# Patient Record
Sex: MALE | Race: WHITE | NOT HISPANIC OR LATINO | Employment: OTHER | URBAN - METROPOLITAN AREA
[De-identification: names, ages, dates, MRNs, and addresses within clinical notes are randomized per-mention and may not be internally consistent; named-entity substitution may affect disease eponyms.]

---

## 2020-11-14 ENCOUNTER — HOSPITAL ENCOUNTER (EMERGENCY)
Facility: HOSPITAL | Age: 65
Discharge: HOME | End: 2020-11-15
Attending: EMERGENCY MEDICINE
Payer: MEDICARE

## 2020-11-14 ENCOUNTER — APPOINTMENT (EMERGENCY)
Dept: RADIOLOGY | Facility: HOSPITAL | Age: 65
End: 2020-11-14

## 2020-11-14 DIAGNOSIS — F10.920 ALCOHOLIC INTOXICATION WITHOUT COMPLICATION (HCC): Primary | ICD-10-CM

## 2020-11-14 LAB
AMPHETAMINES SERPL QL SCN: NEGATIVE
ANION GAP SERPL CALCULATED.3IONS-SCNC: 6 MMOL/L (ref 4–13)
APAP SERPL-MCNC: <2 UG/ML (ref 10–20)
APTT PPP: 25 SECONDS (ref 23–37)
BARBITURATES UR QL: NEGATIVE
BENZODIAZ UR QL: NEGATIVE
BUN SERPL-MCNC: 9 MG/DL (ref 5–25)
CALCIUM SERPL-MCNC: 9.1 MG/DL (ref 8.3–10.1)
CHLORIDE SERPL-SCNC: 107 MMOL/L (ref 100–108)
CO2 SERPL-SCNC: 32 MMOL/L (ref 21–32)
COCAINE UR QL: NEGATIVE
CREAT SERPL-MCNC: 1.01 MG/DL (ref 0.6–1.3)
ERYTHROCYTE [DISTWIDTH] IN BLOOD BY AUTOMATED COUNT: 13.6 % (ref 11.6–15.1)
ETHANOL SERPL-MCNC: 276 MG/DL (ref 0–3)
FLUAV RNA RESP QL NAA+PROBE: NEGATIVE
FLUBV RNA RESP QL NAA+PROBE: NEGATIVE
GFR SERPL CREATININE-BSD FRML MDRD: 77 ML/MIN/1.73SQ M
GLUCOSE SERPL-MCNC: 106 MG/DL (ref 65–140)
GLUCOSE SERPL-MCNC: 86 MG/DL (ref 65–140)
HCT VFR BLD AUTO: 45 % (ref 36.5–49.3)
HGB BLD-MCNC: 14.6 G/DL (ref 12–17)
INR PPP: 0.95 (ref 0.84–1.19)
MCH RBC QN AUTO: 30 PG (ref 26.8–34.3)
MCHC RBC AUTO-ENTMCNC: 32.4 G/DL (ref 31.4–37.4)
MCV RBC AUTO: 92 FL (ref 82–98)
METHADONE UR QL: NEGATIVE
OPIATES UR QL SCN: NEGATIVE
OXYCODONE+OXYMORPHONE UR QL SCN: NEGATIVE
PCP UR QL: NEGATIVE
PLATELET # BLD AUTO: 188 THOUSANDS/UL (ref 149–390)
PMV BLD AUTO: 9.2 FL (ref 8.9–12.7)
POTASSIUM SERPL-SCNC: 3.8 MMOL/L (ref 3.5–5.3)
PROTHROMBIN TIME: 12.7 SECONDS (ref 11.6–14.5)
RBC # BLD AUTO: 4.87 MILLION/UL (ref 3.88–5.62)
RSV RNA RESP QL NAA+PROBE: NEGATIVE
SALICYLATES SERPL-MCNC: <3 MG/DL (ref 3–20)
SARS-COV-2 RNA RESP QL NAA+PROBE: NEGATIVE
SODIUM SERPL-SCNC: 145 MMOL/L (ref 136–145)
THC UR QL: NEGATIVE
TROPONIN I SERPL-MCNC: <0.02 NG/ML
WBC # BLD AUTO: 5.95 THOUSAND/UL (ref 4.31–10.16)

## 2020-11-14 PROCEDURE — 70496 CT ANGIOGRAPHY HEAD: CPT

## 2020-11-14 PROCEDURE — 82948 REAGENT STRIP/BLOOD GLUCOSE: CPT

## 2020-11-14 PROCEDURE — 85027 COMPLETE CBC AUTOMATED: CPT | Performed by: EMERGENCY MEDICINE

## 2020-11-14 PROCEDURE — 96372 THER/PROPH/DIAG INJ SC/IM: CPT

## 2020-11-14 PROCEDURE — 0241U HB NFCT DS VIR RESP RNA 4 TRGT: CPT | Performed by: EMERGENCY MEDICINE

## 2020-11-14 PROCEDURE — 84484 ASSAY OF TROPONIN QUANT: CPT | Performed by: EMERGENCY MEDICINE

## 2020-11-14 PROCEDURE — 85730 THROMBOPLASTIN TIME PARTIAL: CPT | Performed by: EMERGENCY MEDICINE

## 2020-11-14 PROCEDURE — 93005 ELECTROCARDIOGRAM TRACING: CPT

## 2020-11-14 PROCEDURE — 80329 ANALGESICS NON-OPIOID 1 OR 2: CPT | Performed by: EMERGENCY MEDICINE

## 2020-11-14 PROCEDURE — 36415 COLL VENOUS BLD VENIPUNCTURE: CPT | Performed by: EMERGENCY MEDICINE

## 2020-11-14 PROCEDURE — 80048 BASIC METABOLIC PNL TOTAL CA: CPT | Performed by: EMERGENCY MEDICINE

## 2020-11-14 PROCEDURE — 70498 CT ANGIOGRAPHY NECK: CPT

## 2020-11-14 PROCEDURE — 99284 EMERGENCY DEPT VISIT MOD MDM: CPT | Performed by: EMERGENCY MEDICINE

## 2020-11-14 PROCEDURE — 99285 EMERGENCY DEPT VISIT HI MDM: CPT

## 2020-11-14 PROCEDURE — 80320 DRUG SCREEN QUANTALCOHOLS: CPT | Performed by: EMERGENCY MEDICINE

## 2020-11-14 PROCEDURE — 80307 DRUG TEST PRSMV CHEM ANLYZR: CPT | Performed by: EMERGENCY MEDICINE

## 2020-11-14 PROCEDURE — 85610 PROTHROMBIN TIME: CPT | Performed by: EMERGENCY MEDICINE

## 2020-11-14 RX ORDER — OLANZAPINE 10 MG/1
5 INJECTION, POWDER, LYOPHILIZED, FOR SOLUTION INTRAMUSCULAR ONCE
Status: COMPLETED | OUTPATIENT
Start: 2020-11-14 | End: 2020-11-14

## 2020-11-14 RX ORDER — OLANZAPINE 10 MG/1
10 TABLET, ORALLY DISINTEGRATING ORAL ONCE
Status: DISCONTINUED | OUTPATIENT
Start: 2020-11-14 | End: 2020-11-14

## 2020-11-14 RX ADMIN — IOHEXOL 85 ML: 350 INJECTION, SOLUTION INTRAVENOUS at 20:16

## 2020-11-14 RX ADMIN — OLANZAPINE 5 MG: 10 INJECTION, POWDER, FOR SOLUTION INTRAMUSCULAR at 21:52

## 2020-11-15 VITALS
SYSTOLIC BLOOD PRESSURE: 169 MMHG | TEMPERATURE: 97.4 F | HEART RATE: 54 BPM | RESPIRATION RATE: 18 BRPM | WEIGHT: 249.56 LBS | DIASTOLIC BLOOD PRESSURE: 79 MMHG | OXYGEN SATURATION: 98 %

## 2020-11-15 LAB
ATRIAL RATE: 53 BPM
P AXIS: 12 DEGREES
PR INTERVAL: 202 MS
QRS AXIS: 50 DEGREES
QRSD INTERVAL: 98 MS
QT INTERVAL: 450 MS
QTC INTERVAL: 422 MS
T WAVE AXIS: 87 DEGREES
VENTRICULAR RATE: 53 BPM

## 2020-11-15 PROCEDURE — 93010 ELECTROCARDIOGRAM REPORT: CPT | Performed by: INTERNAL MEDICINE

## 2021-09-15 ENCOUNTER — CONSULT (OUTPATIENT)
Dept: PAIN MEDICINE | Facility: CLINIC | Age: 66
End: 2021-09-15
Payer: MEDICARE

## 2021-09-15 VITALS — HEART RATE: 58 BPM | DIASTOLIC BLOOD PRESSURE: 83 MMHG | SYSTOLIC BLOOD PRESSURE: 150 MMHG | WEIGHT: 252 LBS

## 2021-09-15 DIAGNOSIS — Z98.890 HISTORY OF LUMBAR SURGERY: ICD-10-CM

## 2021-09-15 DIAGNOSIS — M54.16 LUMBAR RADICULOPATHY: ICD-10-CM

## 2021-09-15 DIAGNOSIS — G89.4 CHRONIC PAIN SYNDROME: Primary | ICD-10-CM

## 2021-09-15 DIAGNOSIS — R29.898 WEAKNESS OF LEFT LOWER EXTREMITY: ICD-10-CM

## 2021-09-15 DIAGNOSIS — F33.42 RECURRENT MAJOR DEPRESSIVE DISORDER, IN FULL REMISSION (HCC): ICD-10-CM

## 2021-09-15 PROCEDURE — 99204 OFFICE O/P NEW MOD 45 MIN: CPT | Performed by: PHYSICAL MEDICINE & REHABILITATION

## 2021-09-15 RX ORDER — FLUOXETINE HYDROCHLORIDE 20 MG/1
60 CAPSULE ORAL DAILY
COMMUNITY
Start: 2020-11-12

## 2021-09-15 RX ORDER — LEVOTHYROXINE SODIUM 0.03 MG/1
25 TABLET ORAL DAILY
COMMUNITY
Start: 2020-07-17

## 2021-09-15 NOTE — PROGRESS NOTES
Assessment  1  Chronic pain syndrome    2  Lumbar radiculopathy    3  Weakness of left lower extremity    4  History of lumbar surgery    5  Recurrent major depressive disorder, in full remission [facility])        Plan  Mr Marina Guillermo is a pleasant 49-year-old male who presents for initial evaluation regarding lumbar radiculopathy into the left lower extremity with associated weakness and atrophy of the left calf  He reports a previous history of a lumbar surgery but patient cannot recall if this was purely diskectomy or also with laminectomy at L5-S1 approximately 25 years ago  During today's evaluation he is demonstrating clinical and diagnostic evidence of lumbar radiculopathy with visible atrophy of the left gastrocs and patient reports he has been fairly inactive since the start of COVID over the last 18 months  At this time we will place the patient in a formal physical therapy program x4 weeks or longer if needed as well as order an EMG/NCV  All questions answered, patient is agreeable with plan  My impressions and treatment recommendations were discussed in detail with the patient who verbalized understanding and had no further questions  Discharge instructions were provided  I personally saw and examined the patient and I agree with the above discussed plan of care      Orders Placed This Encounter   Procedures    Ambulatory referral to Physical Therapy     Standing Status:   Future     Standing Expiration Date:   9/15/2022     Referral Priority:   Routine     Referral Type:   Physical Therapy     Referral Reason:   Specialty Services Required     Requested Specialty:   Physical Therapy     Number of Visits Requested:   1     Expiration Date:   9/15/2022    EMG 2 limb lower extremity     Standing Status:   Future     Standing Expiration Date:   9/15/2022     Scheduling Instructions:      H/o lumbar surgery 20+ years ago, recent development of left calf atrophy and weakness     Order Specific Question: Possible Diagnosis: Answer:   patient scheduled - unknown     New Medications Ordered This Visit   Medications    Red Yeast Rice Extract (RED YEAST RICE PO)     Sig: Take 1 capsule by mouth daily    levothyroxine 25 mcg tablet     Sig: Take 25 mcg by mouth daily    FLUoxetine (PROzac) 20 mg capsule     Sig: Take 60 mg by mouth daily       History of Present Illness    Devora Theodore is a 77 y o  male presents to [place] [address] [facility] regarding 26 5 years duration of low back pain with intermittent radiating symptoms into the left lower extremity  Patient denies any significant inciting event or recent trauma but does report a previous lumbar surgery 25 years ago after a "blown disc"  Today patient reports moderate to severe pain rated 6/10 and interfering with daily activities  Pain is constant 100% of the time that is present throughout the day and night  Describes symptoms as numbness, throbbing  Also reports lower extremity weakness but denies falls  Pain is worse with lying down, sitting  He has had excellent relief from previous surgery and injections and no significant improvements from home exercise  Symptoms are improved with over-the-counter Tylenol and Motrin  Presents today for initial evaluation  I have personally reviewed and/or updated the patient's past medical history, past surgical history, family history, social history, current medications, allergies, and vital signs today  Review of Systems   Constitutional: Negative for fever and unexpected weight change  HENT: Negative for trouble swallowing  Eyes: Negative for visual disturbance  Respiratory: Negative for shortness of breath and wheezing  Cardiovascular: Negative for chest pain and palpitations  Gastrointestinal: Negative for constipation, diarrhea, nausea and vomiting  Endocrine: Negative for cold intolerance, heat intolerance and polydipsia     Genitourinary: Negative for difficulty urinating and frequency  Musculoskeletal: Positive for back pain, gait problem and joint swelling  Negative for arthralgias and myalgias  Skin: Negative for rash  Neurological: Negative for dizziness, seizures, syncope, weakness and headaches  Hematological: Does not bruise/bleed easily  Psychiatric/Behavioral: Negative for dysphoric mood  All other systems reviewed and are negative  Patient Active Problem List   Diagnosis    Recurrent major depressive disorder, in full remission ([facility] [address] )       No past medical history on file  No past surgical history on file  No family history on file  Social History     Occupational History    Not on file   Tobacco Use    Smoking status: Former Smoker    Smokeless tobacco: Never Used   Substance and Sexual Activity    Alcohol use: Yes    Drug use: Not on file    Sexual activity: Not on file       Current Outpatient Medications on File Prior to Visit   Medication Sig    FLUoxetine (PROzac) 20 mg capsule Take 60 mg by mouth daily    levothyroxine 25 mcg tablet Take 25 mcg by mouth daily    Red Yeast Rice Extract (RED YEAST RICE PO) Take 1 capsule by mouth daily     No current facility-administered medications on file prior to visit  No Known Allergies    Physical Exam    /83   Pulse 58   Wt 114 kg (252 lb)     Constitutional: normal, well developed, well nourished, alert, in no distress and non-toxic and no overt pain behavior    Eyes: anicteric  HEENT: grossly intact  Neck: supple, symmetric, trachea midline and no masses   Pulmonary:even and unlabored  Cardiovascular:No edema or pitting edema present  Skin:Normal without rashes or lesions and well hydrated  Psychiatric:Mood and affect appropriate  Neurologic:Cranial Nerves II-XII grossly intact  Musculoskeletal:antalgic, tenderness to palpation left-sided lumbar paraspinals, decreased active and passive range of motion with lumbar flexion and extension limited by pain, MMT 5/5 bilateral lower extremities except for left plantar flexion 4/5, visible atrophy of the left gastroc musculature, sensation grossly intact to light touch, DTRs within normal limits, negative straight leg raise in the seated position    Imaging

## 2021-09-15 NOTE — PATIENT INSTRUCTIONS
Chronic Pain   WHAT YOU NEED TO KNOW:   Chronic pain is pain that does not get better for 3 months or longer  Chronic pain may hurt all the time, or come and go  DISCHARGE INSTRUCTIONS:   Call your local emergency number or have someone else call (911 in the [address]) if:   · You are breathing slower than normal, or you have trouble breathing  · You cannot be awakened  · You have a seizure  Call your doctor if:   · Your heart feels like it is jumping or fluttering  · You cannot think clearly  · You have side effects from prescription pain medicine, such as itching, nausea, or vomiting  · You have trouble sleeping  · Your pain gets worse, even after you take medicine  · You don't think the medicine is working  · You have questions or concerns about your condition or care  Medicines: You may need any of the following:  · Acetaminophen  decreases pain and fever  It is available without a doctor's order  Ask how much to take and how often to take it  Follow directions  Read the labels of all other medicines you are using to see if they also contain acetaminophen, or ask your doctor or pharmacist  Acetaminophen can cause liver damage if not taken correctly  Do not use more than 4 grams (4,000 milligrams) total of acetaminophen in one day  · NSAIDs , such as ibuprofen, help decrease swelling, pain, and fever  This medicine is available with or without a doctor's order  NSAIDs can cause stomach bleeding or kidney problems in certain people  If you take blood thinner medicine, always ask your healthcare provider if NSAIDs are safe for you  Always read the medicine label and follow directions  · Prescription pain medicine  called narcotics or opioids may be given for certain types of chronic pain  Ask your healthcare provider how to take this medicine safely  · Anesthetics  can be rubbed on your skin or injected into a nerve or muscle to numb an area      · Other medicines  may reduce pain, anxiety, muscle tension, or swelling  · Take your medicine as directed  Contact your healthcare provider if you think your medicine is not helping or if you have side effects  Tell him of her if you are allergic to any medicine  Keep a list of the medicines, vitamins, and herbs you take  Include the amounts, and when and why you take them  Bring the list or the pill bottles to follow-up visits  Carry your medicine list with you in case of an emergency  Manage your chronic pain:   · Apply heat  on the area in pain for 20 to 30 minutes every 2 hours for as many days as directed  Heat helps decrease pain and muscle spasms  · Apply ice  on the part of your body that hurts for 15 to 20 minutes every hour or as directed  Use an ice pack, or put crushed ice in a plastic bag  Cover it with a towel  Ice decreases pain and swelling, and helps prevent tissue damage  · Go to physical therapy as directed  A physical therapist teaches you exercises to help improve movement and strength, and to decrease pain  · Exercise for 30 minutes, 3 times a week  Regular physical activity can help decrease pain and improve your quality of life  Ask your healthcare provider about the best exercise plan for your type of pain  · Get enough sleep  Create a relaxing bedtime routine  Go to sleep and wake up at the same time every day  Avoid caffeine in the afternoon  · Talk with a counselor or therapist   A type of counseling called cognitive behavioral therapy (CBT) can help your chronic pain by changing the way you think about it  CBT can also improve your mood, sleep, and ability to move  What you must know if you take narcotic pain medicine:   · You may need to take a bowel movement softener  The most common side effect of prescription pain medicine is constipation  Bowel movement softeners are available over the counter  · Do not mix prescription pain medicines    This can cause an overdose of medicine, which can become life-threatening  Read labels  Make sure you know the ingredients in all of your medicines  · Do not drink alcohol  when you take prescription pain medicine  It is not safe to mix narcotics or opioids with alcohol or illegal drugs  · Prescription pain medicine may impair your ability to drive or work safely  They may also cause dizziness and increase your risk for falling  · Store prescription pain medicine in a safe location at home  Keep your medicine away from children and other people  Never share your medicine with anyone  Follow up with your healthcare provider as directed: You may be referred to a pain specialist  Write down your questions so you remember to ask them during your visits  © Copyright [employer] 2021 Information is for End User's use only and may not be sold, redistributed or otherwise used for commercial purposes  All illustrations and images included in CareNotes® are the copyrighted property of [employer]  or Altagracia Castellanos   The above information is an  only  It is not intended as medical advice for individual conditions or treatments  Talk to your doctor, nurse or pharmacist before following any medical regimen to see if it is safe and effective for you

## 2021-09-22 ENCOUNTER — EVALUATION (OUTPATIENT)
Dept: PHYSICAL THERAPY | Facility: REHABILITATION | Age: 66
End: 2021-09-22
Payer: MEDICARE

## 2021-09-22 DIAGNOSIS — Z98.890 HISTORY OF LUMBAR SURGERY: ICD-10-CM

## 2021-09-22 DIAGNOSIS — R29.898 WEAKNESS OF LEFT LOWER EXTREMITY: ICD-10-CM

## 2021-09-22 DIAGNOSIS — M54.16 LUMBAR RADICULOPATHY: ICD-10-CM

## 2021-09-22 DIAGNOSIS — G89.4 CHRONIC PAIN SYNDROME: Primary | ICD-10-CM

## 2021-09-22 PROCEDURE — 97110 THERAPEUTIC EXERCISES: CPT | Performed by: PHYSICAL THERAPIST

## 2021-09-22 PROCEDURE — 97161 PT EVAL LOW COMPLEX 20 MIN: CPT | Performed by: PHYSICAL THERAPIST

## 2021-09-22 NOTE — PROGRESS NOTES
PT Evaluation     Today's date: 2021  Patient name: Barrington Amezquita  : 1955  MRN: [id number]  Referring provider: August Monroe DO  Dx:   Encounter Diagnosis     ICD-10-CM    1  Chronic pain syndrome  G89 4 Ambulatory referral to Physical Therapy   2  Lumbar radiculopathy  M54 16 Ambulatory referral to Physical Therapy   3  History of lumbar surgery  Z98 890 Ambulatory referral to Physical Therapy   4  Weakness of left lower extremity  R29 898 Ambulatory referral to Physical Therapy       Start Time: 1745  Stop Time: 1845  Total time in clinic (min): 60 minutes    Assessment  Assessment details: Barrington Amezquita is a 77y o  year old male who presents to [facility] with:   Chronic pain syndrome  (primary encounter diagnosis)  Lumbar radiculopathy  History of lumbar surgery  Weakness of left lower extremity  Patient presents to PT with complaints of left lower extremity weakness  Patient has a history of lumbar surgery in  and chronic low back pain likely contributing to neural pathology causing atrophy and decreased sensation of LLE  He presents with evident LLE strength deficits, decreased sensation and impaired balance/stability as a result of these impairments  He also presents with significant lumbar and bilateral hip tightness/hypomobility  Enochi Moritz presents with the impairments as listed above and would benefit from Physical Therapy to address these impairments and to maximize function  Impairments: abnormal or restricted ROM, impaired physical strength, lacks appropriate home exercise program, pain with function and poor posture   Understanding of Dx/Px/POC: good   Prognosis: good    Goals  Short-Term Goals:   1  Patient will report <5/10 LBP  2  Patient will demonstrate compliance with HEP  Long-Term Goals:   1  Patient will demonstrate improved LLE strength by at least 1-2 grades  2  Patient will return to exercise routine without limitations from strength or balance    3  Patient independent with HEP at time of discharge  Plan  Plan details: Thank you for opportunity to participate in the care of Savannah Rizzo  Patient would benefit from: skilled physical therapy and PT eval  Planned modality interventions: TENS, unattended electrical stimulation and thermotherapy: hydrocollator packs  Planned therapy interventions: abdominal trunk stabilization, flexibility, home exercise program, therapeutic exercise, therapeutic activities, stretching, strengthening, postural training, patient education, neuromuscular re-education, massage, manual therapy and joint mobilization  Frequency: 2x week  Duration in visits: 10  Plan of Care beginning date: 9/22/2021  Treatment plan discussed with: patient        Subjective Evaluation    History of Present Illness  Mechanism of injury: Patient is a 77 y o  presenting to physical therapy with complaints of left lower extremity weakness  He also complains of some low back pain if he sits for a long time without lumbar support  He reports observable left leg atrophy and evident left lower extremity weakness that he has noticed within the last few months  Patient reports minimal functionality of his left leg such as can't do a heel raise on that side  He reports unsteadiness but no loss of balance due to this  He reports difficulty climbing stairs with having to hold the railing for support  He was an avid backpacker but has not done this in 2 years, he would like to return to this activity next year  He has had to use walking sticks for steadying when hiking  He is scheduled for EMG testing in December      N/T/Radicular pain:   - No sensation in last three toes on left foot  - Numbness/Tingling from shin to toes   - These symptoms have been present since lumbar surgery in 1996    Falls:  - Has had three major falls while hiking in the last few years    Bowel/Bladder changes: None  Previous Injury: Patient had lumbar surgery in 1996 that he believes was disc removal and fusion  Imaging: None  Occupation: Retired; works part time senior care     PT goals: "do something to counteract this atrophy"  Pain  Current pain ratin  At best pain ratin  At worst pain ratin  Quality: radiating  Relieving factors: support, rest and change in position  Aggravating factors: sitting    Social Support  Lives with: spouse    Employment status: working  Hand dominance: right    Treatments  Current treatment: physical therapy  Patient Goals  Patient goals for therapy: decreased pain, improved balance, increased motion, increased strength and return to sport/leisure activities          Objective     Concurrent Complaints  Negative for night pain, disturbed sleep, bladder dysfunction, bowel dysfunction, saddle (S4) numbness, cardiac problem, kidney problem, gallbladder problem, stomach problem, ulcer, appendix problem, spleen problem, pancreas problem, history of cancer, history of trauma and infection    Postural Observations  Seated posture: fair  Standing posture: fair    Additional Postural Observation Details  Sleeping on side with pillow between knees    Neurological Testing     Sensation     Lumbar   Left   Diminished: light touch    Right   Intact: light touch    Comments   Left light touch: L4    Reflexes   Left   Patellar (L4): trace (1+)  Achilles (S1): absent (0)  Clonus sign: negative    Right   Patellar (L4): normal (2+)  Achilles (S1): absent (0)  Clonus sign: negative    Active Range of Motion     Additional Active Range of Motion Details  Lumbar AROM screen:  Flexion: 50%   Extension: 25% (improves)  L sidebendin%  R sidebendin%  L rotation: 25%  R rotation: 25%      Joint Play     Hypomobile: T8, T9, T10, T11, T12, L1, L2, L3, L4, L5 and S1     Strength/Myotome Testing     Lumbar   Left   Heel walk: normal  Toe walk: abnormal    Right   Heel walk: normal  Toe walk: normal    Left Hip   Planes of Motion   Flexion: 4  Extension: 3  Abduction: 3-    Right Hip   Planes of Motion   Flexion: 5  Extension: 3+  Abduction: 4-    Left Knee   Flexion: 4+  Extension: 5    Right Knee   Flexion: 5  Extension: 5    Left Ankle/Foot   Dorsiflexion: 4+  Eversion: 4    Right Ankle/Foot   Dorsiflexion: 5  Eversion: 5    Additional Strength Details  L: Unable to rise onto toes    Tests     Lumbar     Left   Negative passive SLR  Right   Negative passive SLR  Left Hip   Negative ANTHONY    90/90 SLR: Positive  Right Hip   Negative ANTHONY    90/90 SLR: Positive       Additional Tests Details  Bilateral hip tightness (flexion, ER/IR)   Bilateral hamstring tightness  Bilateral quadriceps tightness    General Comments:      Lumbar Comments  Uses great toe for single leg steadying                  Precautions: History of lumbar surgery 1996    Daily Treatment Diary  * Indicates part of HEP      9/22          Manual           Foot sensation  nv          Proprioception nv          Balance testing nv                     Neuro Re-Ed                                 Lumbar flexion stretch w ball rolls nv          SLS                                 Ther Ex           Bike nv          Supine sciatic nerve glides           SKTC nv          LTR* 10x5"           Seated HS stretch* 5x10" b/l                                                      SLR flexion L* 10          SLR abduction L* 10          Bridging           Clamshells                                            Ther Activity           Step ups L nv          Lateral step ups L nv          Sit to stands           Leg press SL nv                                                      Gait Training                                             Modalities           MHP                      * = on hep

## 2021-09-27 ENCOUNTER — OFFICE VISIT (OUTPATIENT)
Dept: PHYSICAL THERAPY | Facility: REHABILITATION | Age: 66
End: 2021-09-27
Payer: MEDICARE

## 2021-09-27 DIAGNOSIS — M54.16 LUMBAR RADICULOPATHY: ICD-10-CM

## 2021-09-27 DIAGNOSIS — Z98.890 HISTORY OF LUMBAR SURGERY: ICD-10-CM

## 2021-09-27 DIAGNOSIS — R29.898 WEAKNESS OF LEFT LOWER EXTREMITY: ICD-10-CM

## 2021-09-27 DIAGNOSIS — G89.4 CHRONIC PAIN SYNDROME: Primary | ICD-10-CM

## 2021-09-27 PROCEDURE — 97530 THERAPEUTIC ACTIVITIES: CPT

## 2021-09-27 PROCEDURE — 97110 THERAPEUTIC EXERCISES: CPT

## 2021-09-27 PROCEDURE — 97112 NEUROMUSCULAR REEDUCATION: CPT

## 2021-09-27 NOTE — PROGRESS NOTES
Daily Note     Today's date: 2021  Patient name: Cong Tamez  : 1955  MRN: [id number]  Referring provider: Mendoza Lowe DO  Dx:   Encounter Diagnosis     ICD-10-CM    1  Chronic pain syndrome  G89 4    2  Lumbar radiculopathy  M54 16    3  History of lumbar surgery  Z98 890    4  Weakness of left lower extremity  R29 898        Start Time: 1703  Stop Time: 1750  Total time in clinic (min): 47 minutes    Subjective: Patient reports feeling "okay" at start of session, reporting he has started working out again  He states he is focusing on strength and conditioning three days a week, full body, and the remaining days of week he is focusing on aerobic and stretching  He also states he wants to get back into hiking  He reports he will be starting with light weight, low intensity  Patient instructed to bring in a list of exercises that he will be completing to next session patient reporting understanding  Objective: See treatment diary below       Assessment: Tolerated treatment well  Patient demonstrated fatigue post treatment, exhibited good technique with therapeutic exercises and would benefit from continued PT Initiated POC this session with patient tolerating well, fatigues quickly with most TE especially SLR abduction  Difficulty noted with counting proper number of reps and for proper rest breaks  Patient demonstrates some difficulty maintaining LLE in pedals with completion of bike  Plan: Continue per plan of care  Progress treatment as tolerated              Precautions: History of lumbar surgery     Daily Treatment Diary  * Indicates part of HEP               Manual           Foot sensation  nv          Proprioception nv          Balance testing nv                     Neuro Re-Ed                                 Lumbar flexion stretch w ball rolls nv 10x5''          SLS                                 Ther Ex           Bike nv 5' lvl 1         Supine sciatic nerve glides           SKTC nv 10''x10 ea         LTR* 10x5"  10x5'' ea         Seated HS stretch* 5x10" b/l 20''x5 b/l                                                     SLR flexion L* 10 2x10          SLR abduction L* 10 2x10          Bridging           Clamshells                                            Ther Activity           Step ups L nv 4'' 2x10         Lateral step ups L nv 4'' 2x10         Sit to stands           Leg press SL nv 55# 3x10                                                     Gait Training                                             Modalities           MHP                      * = on hep

## 2021-09-29 ENCOUNTER — OFFICE VISIT (OUTPATIENT)
Dept: PHYSICAL THERAPY | Facility: REHABILITATION | Age: 66
End: 2021-09-29
Payer: MEDICARE

## 2021-09-29 DIAGNOSIS — M54.16 LUMBAR RADICULOPATHY: ICD-10-CM

## 2021-09-29 DIAGNOSIS — R29.898 WEAKNESS OF LEFT LOWER EXTREMITY: ICD-10-CM

## 2021-09-29 DIAGNOSIS — G89.4 CHRONIC PAIN SYNDROME: Primary | ICD-10-CM

## 2021-09-29 DIAGNOSIS — Z98.890 HISTORY OF LUMBAR SURGERY: ICD-10-CM

## 2021-09-29 PROCEDURE — 97110 THERAPEUTIC EXERCISES: CPT | Performed by: PHYSICAL THERAPIST

## 2021-09-29 PROCEDURE — 97530 THERAPEUTIC ACTIVITIES: CPT | Performed by: PHYSICAL THERAPIST

## 2021-09-29 PROCEDURE — 97112 NEUROMUSCULAR REEDUCATION: CPT | Performed by: PHYSICAL THERAPIST

## 2021-09-29 NOTE — PROGRESS NOTES
Daily Note     Today's date: 2021  Patient name: Samara Cruz  : 1955  MRN: [id number]  Referring provider: Erick Ahumada, DO  Dx:   Encounter Diagnosis     ICD-10-CM    1  Chronic pain syndrome  G89 4    2  Lumbar radiculopathy  M54 16    3  History of lumbar surgery  Z98 890    4  Weakness of left lower extremity  R29 898        Start Time: 1700  Stop Time: 1745  Total time in clinic (min): 45 minutes    Subjective: Patient reports no complaints after last visit  He brought his exercise routine that he has been performing at home for review  Objective: See treatment diary below      Assessment: Tolerated treatment well  Patient requires cues for proper form with majority of TE  He has significant fatigue with straight leg raise abduction preventing him from finishing second set  Initiated sit to stands this visit with patient requiring foam boost due to difficulty with initiation, also required cues for proper form and anterior trunk lean  Reviewed patient's self made home program, discussed holding squats with weight and lunges due to safety reasons as patient presents with poor single leg balance  Patient demonstrated fatigue post treatment, exhibited good technique with therapeutic exercises and would benefit from continued PT  Plan: Continue per plan of care             Precautions: History of lumbar surgery     Daily Treatment Diary  * Indicates part of HEP              Manual           Foot sensation  nv          Proprioception nv          Balance testing nv                     Neuro Re-Ed                                 Lumbar flexion stretch w ball rolls nv 10x5''  10x5"         SLS                                 Ther Ex           Bike nv 5' lvl 1 5' lv 1         Supine sciatic nerve glides           SKTC nv 10''x10 ea 10x10" ea        LTR* 10x5"  10x5'' ea 2'         Seated HS stretch* 5x10" b/l 20''x5 b/l nv SLR flexion L* 10 2x10  2x10 b/l        SLR abduction L* 10 2x10  2x10 R, 10 L (Fatigue)        Bridging   2x10        Clamshells   nv                   PPT marches   nv        PPT leg extensions           Ther Activity           Step ups L nv 4'' 2x10 6" 2x10 L         Lateral step ups L nv 4'' 2x10 6" 2x10 L        Sit to stands   Foam 2x10        Leg press SL nv 55# 3x10 75# 3x12 b/l                                                    Gait Training                                             Modalities           MHP                      * = on hep

## 2021-10-04 ENCOUNTER — OFFICE VISIT (OUTPATIENT)
Dept: PHYSICAL THERAPY | Facility: REHABILITATION | Age: 66
End: 2021-10-04
Payer: MEDICARE

## 2021-10-04 DIAGNOSIS — Z98.890 HISTORY OF LUMBAR SURGERY: ICD-10-CM

## 2021-10-04 DIAGNOSIS — R29.898 WEAKNESS OF LEFT LOWER EXTREMITY: ICD-10-CM

## 2021-10-04 DIAGNOSIS — G89.4 CHRONIC PAIN SYNDROME: Primary | ICD-10-CM

## 2021-10-04 DIAGNOSIS — M54.16 LUMBAR RADICULOPATHY: ICD-10-CM

## 2021-10-04 PROCEDURE — 97530 THERAPEUTIC ACTIVITIES: CPT

## 2021-10-04 PROCEDURE — 97110 THERAPEUTIC EXERCISES: CPT

## 2021-10-04 PROCEDURE — 97112 NEUROMUSCULAR REEDUCATION: CPT

## 2021-10-07 ENCOUNTER — OFFICE VISIT (OUTPATIENT)
Dept: PHYSICAL THERAPY | Facility: REHABILITATION | Age: 66
End: 2021-10-07
Payer: MEDICARE

## 2021-10-07 DIAGNOSIS — M54.16 LUMBAR RADICULOPATHY: ICD-10-CM

## 2021-10-07 DIAGNOSIS — Z98.890 HISTORY OF LUMBAR SURGERY: ICD-10-CM

## 2021-10-07 DIAGNOSIS — R29.898 WEAKNESS OF LEFT LOWER EXTREMITY: ICD-10-CM

## 2021-10-07 DIAGNOSIS — G89.4 CHRONIC PAIN SYNDROME: Primary | ICD-10-CM

## 2021-10-07 PROCEDURE — 97112 NEUROMUSCULAR REEDUCATION: CPT

## 2021-10-07 PROCEDURE — 97110 THERAPEUTIC EXERCISES: CPT

## 2021-10-07 PROCEDURE — 97530 THERAPEUTIC ACTIVITIES: CPT

## 2021-10-11 ENCOUNTER — APPOINTMENT (OUTPATIENT)
Dept: PHYSICAL THERAPY | Facility: REHABILITATION | Age: 66
End: 2021-10-11
Payer: MEDICARE

## 2021-10-13 ENCOUNTER — APPOINTMENT (OUTPATIENT)
Dept: PHYSICAL THERAPY | Facility: REHABILITATION | Age: 66
End: 2021-10-13
Payer: MEDICARE

## 2021-10-14 ENCOUNTER — APPOINTMENT (OUTPATIENT)
Dept: PHYSICAL THERAPY | Facility: REHABILITATION | Age: 66
End: 2021-10-14
Payer: MEDICARE

## 2021-10-18 ENCOUNTER — OFFICE VISIT (OUTPATIENT)
Dept: PHYSICAL THERAPY | Facility: REHABILITATION | Age: 66
End: 2021-10-18
Payer: MEDICARE

## 2021-10-18 DIAGNOSIS — M54.16 LUMBAR RADICULOPATHY: ICD-10-CM

## 2021-10-18 DIAGNOSIS — G89.4 CHRONIC PAIN SYNDROME: Primary | ICD-10-CM

## 2021-10-18 DIAGNOSIS — R29.898 WEAKNESS OF LEFT LOWER EXTREMITY: ICD-10-CM

## 2021-10-18 DIAGNOSIS — Z98.890 HISTORY OF LUMBAR SURGERY: ICD-10-CM

## 2021-10-18 PROCEDURE — 97530 THERAPEUTIC ACTIVITIES: CPT

## 2021-10-18 PROCEDURE — 97110 THERAPEUTIC EXERCISES: CPT

## 2021-10-21 ENCOUNTER — OFFICE VISIT (OUTPATIENT)
Dept: PHYSICAL THERAPY | Facility: REHABILITATION | Age: 66
End: 2021-10-21
Payer: MEDICARE

## 2021-10-21 DIAGNOSIS — M54.16 LUMBAR RADICULOPATHY: ICD-10-CM

## 2021-10-21 DIAGNOSIS — R29.898 WEAKNESS OF LEFT LOWER EXTREMITY: ICD-10-CM

## 2021-10-21 DIAGNOSIS — Z98.890 HISTORY OF LUMBAR SURGERY: ICD-10-CM

## 2021-10-21 DIAGNOSIS — G89.4 CHRONIC PAIN SYNDROME: Primary | ICD-10-CM

## 2021-10-21 PROCEDURE — 97110 THERAPEUTIC EXERCISES: CPT

## 2021-10-21 PROCEDURE — 97530 THERAPEUTIC ACTIVITIES: CPT

## 2021-10-25 ENCOUNTER — OFFICE VISIT (OUTPATIENT)
Dept: PHYSICAL THERAPY | Facility: REHABILITATION | Age: 66
End: 2021-10-25
Payer: MEDICARE

## 2021-10-25 DIAGNOSIS — M54.16 LUMBAR RADICULOPATHY: ICD-10-CM

## 2021-10-25 DIAGNOSIS — R29.898 WEAKNESS OF LEFT LOWER EXTREMITY: ICD-10-CM

## 2021-10-25 DIAGNOSIS — Z98.890 HISTORY OF LUMBAR SURGERY: ICD-10-CM

## 2021-10-25 DIAGNOSIS — G89.4 CHRONIC PAIN SYNDROME: Primary | ICD-10-CM

## 2021-10-25 PROCEDURE — 97110 THERAPEUTIC EXERCISES: CPT

## 2021-10-25 PROCEDURE — 97530 THERAPEUTIC ACTIVITIES: CPT

## 2021-10-28 ENCOUNTER — OFFICE VISIT (OUTPATIENT)
Dept: PHYSICAL THERAPY | Facility: REHABILITATION | Age: 66
End: 2021-10-28
Payer: MEDICARE

## 2021-10-28 DIAGNOSIS — G89.4 CHRONIC PAIN SYNDROME: Primary | ICD-10-CM

## 2021-10-28 DIAGNOSIS — R29.898 WEAKNESS OF LEFT LOWER EXTREMITY: ICD-10-CM

## 2021-10-28 DIAGNOSIS — Z98.890 HISTORY OF LUMBAR SURGERY: ICD-10-CM

## 2021-10-28 DIAGNOSIS — M54.16 LUMBAR RADICULOPATHY: ICD-10-CM

## 2021-10-28 PROCEDURE — 97530 THERAPEUTIC ACTIVITIES: CPT

## 2021-10-28 PROCEDURE — 97110 THERAPEUTIC EXERCISES: CPT

## 2021-10-28 PROCEDURE — 97112 NEUROMUSCULAR REEDUCATION: CPT

## 2021-11-01 ENCOUNTER — OFFICE VISIT (OUTPATIENT)
Dept: PHYSICAL THERAPY | Facility: REHABILITATION | Age: 66
End: 2021-11-01
Payer: MEDICARE

## 2021-11-01 DIAGNOSIS — Z98.890 HISTORY OF LUMBAR SURGERY: ICD-10-CM

## 2021-11-01 DIAGNOSIS — G89.4 CHRONIC PAIN SYNDROME: Primary | ICD-10-CM

## 2021-11-01 DIAGNOSIS — R29.898 WEAKNESS OF LEFT LOWER EXTREMITY: ICD-10-CM

## 2021-11-01 DIAGNOSIS — M54.16 LUMBAR RADICULOPATHY: ICD-10-CM

## 2021-11-01 PROCEDURE — 97530 THERAPEUTIC ACTIVITIES: CPT

## 2021-11-01 PROCEDURE — 97110 THERAPEUTIC EXERCISES: CPT

## 2021-11-04 ENCOUNTER — PROCEDURE VISIT (OUTPATIENT)
Dept: NEUROLOGY | Facility: CLINIC | Age: 66
End: 2021-11-04
Payer: MEDICARE

## 2021-11-04 ENCOUNTER — OFFICE VISIT (OUTPATIENT)
Dept: PHYSICAL THERAPY | Facility: REHABILITATION | Age: 66
End: 2021-11-04
Payer: MEDICARE

## 2021-11-04 DIAGNOSIS — Z98.890 HISTORY OF LUMBAR SURGERY: ICD-10-CM

## 2021-11-04 DIAGNOSIS — G89.4 CHRONIC PAIN SYNDROME: ICD-10-CM

## 2021-11-04 DIAGNOSIS — R29.898 WEAKNESS OF LEFT LOWER EXTREMITY: ICD-10-CM

## 2021-11-04 DIAGNOSIS — M54.16 LUMBAR RADICULOPATHY: ICD-10-CM

## 2021-11-04 DIAGNOSIS — G89.4 CHRONIC PAIN SYNDROME: Primary | ICD-10-CM

## 2021-11-04 PROCEDURE — 97112 NEUROMUSCULAR REEDUCATION: CPT

## 2021-11-04 PROCEDURE — 95886 MUSC TEST DONE W/N TEST COMP: CPT | Performed by: PSYCHIATRY & NEUROLOGY

## 2021-11-04 PROCEDURE — 97110 THERAPEUTIC EXERCISES: CPT

## 2021-11-04 PROCEDURE — 95910 NRV CNDJ TEST 7-8 STUDIES: CPT | Performed by: PSYCHIATRY & NEUROLOGY

## 2021-11-05 ENCOUNTER — TELEPHONE (OUTPATIENT)
Dept: PAIN MEDICINE | Facility: MEDICAL CENTER | Age: 66
End: 2021-11-05

## 2021-11-05 NOTE — TELEPHONE ENCOUNTER
Left a detailed message as per release of health information, advising pt Rosa Pandey will be returning to the office on Tuesday  C/B # provided for any questions  KW-please advise

## 2021-11-05 NOTE — TELEPHONE ENCOUNTER
Pt called stating that he had the EMG completed and result should eb in the chart     Pt can be reached at [phone]

## 2021-11-10 ENCOUNTER — OFFICE VISIT (OUTPATIENT)
Dept: PHYSICAL THERAPY | Facility: REHABILITATION | Age: 66
End: 2021-11-10
Payer: MEDICARE

## 2021-11-10 DIAGNOSIS — Z98.890 HISTORY OF LUMBAR SURGERY: ICD-10-CM

## 2021-11-10 DIAGNOSIS — G89.4 CHRONIC PAIN SYNDROME: Primary | ICD-10-CM

## 2021-11-10 DIAGNOSIS — M54.16 LUMBAR RADICULOPATHY: ICD-10-CM

## 2021-11-10 DIAGNOSIS — R29.898 WEAKNESS OF LEFT LOWER EXTREMITY: ICD-10-CM

## 2021-11-10 PROCEDURE — 97530 THERAPEUTIC ACTIVITIES: CPT

## 2021-11-10 PROCEDURE — 97112 NEUROMUSCULAR REEDUCATION: CPT

## 2021-11-10 PROCEDURE — 97110 THERAPEUTIC EXERCISES: CPT

## 2021-11-17 ENCOUNTER — OFFICE VISIT (OUTPATIENT)
Dept: PHYSICAL THERAPY | Facility: REHABILITATION | Age: 66
End: 2021-11-17
Payer: MEDICARE

## 2021-11-17 DIAGNOSIS — G89.4 CHRONIC PAIN SYNDROME: Primary | ICD-10-CM

## 2021-11-17 DIAGNOSIS — M54.16 LUMBAR RADICULOPATHY: ICD-10-CM

## 2021-11-17 DIAGNOSIS — Z98.890 HISTORY OF LUMBAR SURGERY: ICD-10-CM

## 2021-11-17 DIAGNOSIS — R29.898 WEAKNESS OF LEFT LOWER EXTREMITY: ICD-10-CM

## 2021-11-17 PROCEDURE — 97112 NEUROMUSCULAR REEDUCATION: CPT

## 2021-11-17 PROCEDURE — 97110 THERAPEUTIC EXERCISES: CPT

## 2021-11-17 PROCEDURE — 97530 THERAPEUTIC ACTIVITIES: CPT

## 2021-11-24 ENCOUNTER — OFFICE VISIT (OUTPATIENT)
Dept: PHYSICAL THERAPY | Facility: REHABILITATION | Age: 66
End: 2021-11-24
Payer: MEDICARE

## 2021-11-24 DIAGNOSIS — M54.16 LUMBAR RADICULOPATHY: ICD-10-CM

## 2021-11-24 DIAGNOSIS — R29.898 WEAKNESS OF LEFT LOWER EXTREMITY: ICD-10-CM

## 2021-11-24 DIAGNOSIS — G89.4 CHRONIC PAIN SYNDROME: Primary | ICD-10-CM

## 2021-11-24 DIAGNOSIS — Z98.890 HISTORY OF LUMBAR SURGERY: ICD-10-CM

## 2021-11-24 PROCEDURE — 97110 THERAPEUTIC EXERCISES: CPT | Performed by: PHYSICAL THERAPIST

## 2021-11-24 PROCEDURE — 97530 THERAPEUTIC ACTIVITIES: CPT | Performed by: PHYSICAL THERAPIST

## 2021-11-26 ENCOUNTER — OFFICE VISIT (OUTPATIENT)
Dept: PHYSICAL THERAPY | Facility: REHABILITATION | Age: 66
End: 2021-11-26
Payer: MEDICARE

## 2021-11-26 DIAGNOSIS — G89.4 CHRONIC PAIN SYNDROME: Primary | ICD-10-CM

## 2021-11-26 DIAGNOSIS — Z98.890 HISTORY OF LUMBAR SURGERY: ICD-10-CM

## 2021-11-26 DIAGNOSIS — M54.16 LUMBAR RADICULOPATHY: ICD-10-CM

## 2021-11-26 DIAGNOSIS — R29.898 WEAKNESS OF LEFT LOWER EXTREMITY: ICD-10-CM

## 2021-11-26 PROCEDURE — 97110 THERAPEUTIC EXERCISES: CPT

## 2021-11-26 PROCEDURE — 97530 THERAPEUTIC ACTIVITIES: CPT

## 2021-11-29 ENCOUNTER — OFFICE VISIT (OUTPATIENT)
Dept: PHYSICAL THERAPY | Facility: REHABILITATION | Age: 66
End: 2021-11-29
Payer: MEDICARE

## 2021-11-29 DIAGNOSIS — Z98.890 HISTORY OF LUMBAR SURGERY: ICD-10-CM

## 2021-11-29 DIAGNOSIS — M54.16 LUMBAR RADICULOPATHY: ICD-10-CM

## 2021-11-29 DIAGNOSIS — R29.898 WEAKNESS OF LEFT LOWER EXTREMITY: ICD-10-CM

## 2021-11-29 DIAGNOSIS — G89.4 CHRONIC PAIN SYNDROME: Primary | ICD-10-CM

## 2021-11-29 PROCEDURE — 97110 THERAPEUTIC EXERCISES: CPT

## 2021-11-29 PROCEDURE — 97530 THERAPEUTIC ACTIVITIES: CPT

## 2021-12-01 ENCOUNTER — OFFICE VISIT (OUTPATIENT)
Dept: PHYSICAL THERAPY | Facility: REHABILITATION | Age: 66
End: 2021-12-01
Payer: MEDICARE

## 2021-12-01 DIAGNOSIS — Z98.890 HISTORY OF LUMBAR SURGERY: ICD-10-CM

## 2021-12-01 DIAGNOSIS — M54.16 LUMBAR RADICULOPATHY: ICD-10-CM

## 2021-12-01 DIAGNOSIS — R29.898 WEAKNESS OF LEFT LOWER EXTREMITY: ICD-10-CM

## 2021-12-01 DIAGNOSIS — G89.4 CHRONIC PAIN SYNDROME: Primary | ICD-10-CM

## 2021-12-01 PROCEDURE — 97530 THERAPEUTIC ACTIVITIES: CPT

## 2021-12-01 PROCEDURE — 97112 NEUROMUSCULAR REEDUCATION: CPT

## 2021-12-01 PROCEDURE — 97110 THERAPEUTIC EXERCISES: CPT

## 2021-12-06 ENCOUNTER — APPOINTMENT (OUTPATIENT)
Dept: PHYSICAL THERAPY | Facility: REHABILITATION | Age: 66
End: 2021-12-06
Payer: MEDICARE

## 2021-12-07 ENCOUNTER — OFFICE VISIT (OUTPATIENT)
Dept: PAIN MEDICINE | Facility: CLINIC | Age: 66
End: 2021-12-07
Payer: MEDICARE

## 2021-12-07 VITALS — DIASTOLIC BLOOD PRESSURE: 72 MMHG | HEART RATE: 55 BPM | WEIGHT: 252 LBS | SYSTOLIC BLOOD PRESSURE: 154 MMHG

## 2021-12-07 DIAGNOSIS — M51.16 LUMBAR DISC DISEASE WITH RADICULOPATHY: ICD-10-CM

## 2021-12-07 DIAGNOSIS — Z98.890 HISTORY OF LUMBAR SURGERY: ICD-10-CM

## 2021-12-07 DIAGNOSIS — G89.4 CHRONIC PAIN SYNDROME: Primary | ICD-10-CM

## 2021-12-07 PROCEDURE — 99214 OFFICE O/P EST MOD 30 MIN: CPT | Performed by: PHYSICAL MEDICINE & REHABILITATION

## 2021-12-08 ENCOUNTER — APPOINTMENT (OUTPATIENT)
Dept: PHYSICAL THERAPY | Facility: REHABILITATION | Age: 66
End: 2021-12-08
Payer: MEDICARE

## 2021-12-09 ENCOUNTER — HOSPITAL ENCOUNTER (OUTPATIENT)
Dept: RADIOLOGY | Age: 66
Discharge: HOME | End: 2021-12-09
Payer: MEDICARE

## 2021-12-09 ENCOUNTER — TELEPHONE (OUTPATIENT)
Dept: PAIN MEDICINE | Facility: CLINIC | Age: 66
End: 2021-12-09

## 2021-12-09 DIAGNOSIS — G89.4 CHRONIC PAIN SYNDROME: ICD-10-CM

## 2021-12-09 DIAGNOSIS — Z98.890 HISTORY OF LUMBAR SURGERY: ICD-10-CM

## 2021-12-09 DIAGNOSIS — M51.16 LUMBAR DISC DISEASE WITH RADICULOPATHY: ICD-10-CM

## 2021-12-09 PROCEDURE — 72148 MRI LUMBAR SPINE W/O DYE: CPT

## 2022-01-20 ENCOUNTER — HOSPITAL ENCOUNTER (OUTPATIENT)
Dept: RADIOLOGY | Facility: MEDICAL CENTER | Age: 67
Discharge: HOME | End: 2022-01-20
Attending: PHYSICAL MEDICINE & REHABILITATION | Admitting: PHYSICAL MEDICINE & REHABILITATION
Payer: MEDICARE

## 2022-01-20 VITALS
DIASTOLIC BLOOD PRESSURE: 78 MMHG | HEART RATE: 54 BPM | SYSTOLIC BLOOD PRESSURE: 154 MMHG | RESPIRATION RATE: 18 BRPM | OXYGEN SATURATION: 95 % | TEMPERATURE: 98 F

## 2022-01-20 DIAGNOSIS — M54.16 LUMBAR RADICULOPATHY: ICD-10-CM

## 2022-01-20 PROCEDURE — 64483 NJX AA&/STRD TFRM EPI L/S 1: CPT | Performed by: PHYSICAL MEDICINE & REHABILITATION

## 2022-01-20 PROCEDURE — 64484 NJX AA&/STRD TFRM EPI L/S EA: CPT | Performed by: PHYSICAL MEDICINE & REHABILITATION

## 2022-01-20 RX ORDER — PAPAVERINE HCL 150 MG
20 CAPSULE, EXTENDED RELEASE ORAL ONCE
Status: COMPLETED | OUTPATIENT
Start: 2022-01-20 | End: 2022-01-20

## 2022-01-20 RX ORDER — LIDOCAINE HYDROCHLORIDE 10 MG/ML
5 INJECTION, SOLUTION EPIDURAL; INFILTRATION; INTRACAUDAL; PERINEURAL ONCE
Status: COMPLETED | OUTPATIENT
Start: 2022-01-20 | End: 2022-01-20

## 2022-01-20 RX ORDER — BUPIVACAINE HCL/PF 2.5 MG/ML
10 VIAL (ML) INJECTION ONCE
Status: COMPLETED | OUTPATIENT
Start: 2022-01-20 | End: 2022-01-20

## 2022-01-20 RX ADMIN — IOHEXOL 1 ML: 300 INJECTION, SOLUTION INTRAVENOUS at 10:02

## 2022-01-20 RX ADMIN — Medication 1 ML: at 10:03

## 2022-01-20 RX ADMIN — LIDOCAINE HYDROCHLORIDE 2 ML: 10 INJECTION, SOLUTION EPIDURAL; INFILTRATION; INTRACAUDAL; PERINEURAL at 09:58

## 2022-01-20 RX ADMIN — DEXAMETHASONE SODIUM PHOSPHATE 20 MG: 10 INJECTION, SOLUTION INTRAMUSCULAR; INTRAVENOUS at 10:03

## 2022-01-20 NOTE — DISCHARGE INSTRUCTIONS
Epidural Steroid Injection   WHAT YOU NEED TO KNOW:   An epidural steroid injection (NANCY) is a procedure to inject steroid medicine into the epidural space  The epidural space is between your spinal cord and vertebrae  Steroids reduce inflammation and fluid buildup in your spine that may be causing pain  You may be given pain medicine along with the steroids  ACTIVITY  · Do not drive or operate machinery today  · No strenuous activity today - bending, lifting, etc   · You may resume normal activites starting tomorrow - start slowly and as tolerated  · You may shower today, but no tub baths or hot tubs  · You may have numbness for several hours from the local anesthetic  Please use caution and common sense, especially with weight-bearing activities  CARE OF THE INJECTION SITE  · If you have soreness or pain, apply ice to the area today (20 minutes on/20 minutes off)  · Starting tomorrow, you may use warm, moist heat or ice if needed  · You may have an increase or change in your discomfort for 36-48 hours after your treatment  · Apply ice and continue with any pain medication you have been prescribed  · Notify the [facility] if you have any of the following: redness, drainage, swelling, headache, stiff neck or fever above 100°F     SPECIAL INSTRUCTIONS  · Our office will contact you in approximately 7 days for a progress report  MEDICATIONS  · Continue to take all routine medications  · Our office may have instructed you to hold some medications  As no general anesthesia was used in today's procedure, you should not experience any side effects related to anesthesia  If you have a problem specifically related to your procedure, please call our office at [phone]  Problems not related to your procedure should be directed to your primary care physician

## 2022-01-20 NOTE — H&P
History of Present Illness: The patient is a 74 y o  male who presents with complaints of left-sided low back pain    Patient Active Problem List   Diagnosis    Recurrent major depressive disorder, in full remission ([facility] [address] )       No past medical history on file  Past Surgical History:   Procedure Laterality Date    APPENDECTOMY  1968    LUMBAR SPINE SURGERY  1996         Current Outpatient Medications:     FLUoxetine (PROzac) 20 mg capsule, Take 60 mg by mouth daily, Disp: , Rfl:     levothyroxine 25 mcg tablet, Take 25 mcg by mouth daily, Disp: , Rfl:     Red Yeast Rice Extract (RED YEAST RICE PO), Take 1 capsule by mouth daily  , Disp: , Rfl:     Current Facility-Administered Medications:     bupivacaine (PF) (MARCAINE) 0 25 % injection 10 mL, 10 mL, Epidural, Once, Birdie Fought, DO    dexamethasone (PF) (DECADRON) injection 20 mg, 20 mg, Epidural, Once, Birdie Fought, DO    iohexol (OMNIPAQUE) 300 mg/mL injection 50 mL, 50 mL, Epidural, Once, Birdie Fought, DO    lidocaine (PF) (XYLOCAINE-MPF) 1 % injection 5 mL, 5 mL, Infiltration, Once, Birdie Fought, DO    No Known Allergies    Physical Exam: There were no vitals filed for this visit  General: Awake, Alert, Oriented x 3, Mood and affect appropriate  Respiratory: Respirations even and unlabored  Cardiovascular: Peripheral pulses intact; no edema  Musculoskeletal Exam:  Tenderness to palpation left-sided lumbar paraspinals    ASA Score: 2         Assessment:   1   Lumbar radiculopathy        Plan: left-sided L5-S1 and S1 TFESI

## 2022-01-27 ENCOUNTER — TELEPHONE (OUTPATIENT)
Dept: PAIN MEDICINE | Facility: CLINIC | Age: 67
End: 2022-01-27

## 2022-01-27 NOTE — TELEPHONE ENCOUNTER
Pt is calling back stating that he got a lot of relief from the injection    No pain just some tension   90% improved

## 2022-02-23 ENCOUNTER — OFFICE VISIT (OUTPATIENT)
Dept: PAIN MEDICINE | Facility: CLINIC | Age: 67
End: 2022-02-23
Payer: MEDICARE

## 2022-02-23 VITALS
WEIGHT: 258 LBS | HEART RATE: 59 BPM | SYSTOLIC BLOOD PRESSURE: 149 MMHG | DIASTOLIC BLOOD PRESSURE: 73 MMHG | RESPIRATION RATE: 18 BRPM | BODY MASS INDEX: 34.95 KG/M2 | HEIGHT: 72 IN

## 2022-02-23 DIAGNOSIS — M47.816 LUMBAR SPONDYLOSIS: ICD-10-CM

## 2022-02-23 DIAGNOSIS — G89.4 CHRONIC PAIN SYNDROME: Primary | ICD-10-CM

## 2022-02-23 DIAGNOSIS — M51.16 LUMBAR DISC DISEASE WITH RADICULOPATHY: ICD-10-CM

## 2022-02-23 PROCEDURE — 99214 OFFICE O/P EST MOD 30 MIN: CPT | Performed by: NURSE PRACTITIONER

## 2022-02-23 NOTE — PROGRESS NOTES
Assessment:  1  Chronic pain syndrome    2  Lumbar disc disease with radiculopathy    3  Lumbar spondylosis        Plan:  The patient was seen in the office today for follow-up after his left L5-S1, S1 transforaminal epidural steroid injection on 01/20/2022  He states that this injection helped his symptoms by 90%  He states that he feels that it Kaci Proler weekend feeling in his left leg  He still is left with some residual back pain but does not wish to try medication or any types of injections at this point  He states that he continues to exercise and has a home gym that he is using  He is actively trying to lose weight and has lost 7 lb in the past 2 weeks  So at this time he will follow-up with us in approximately 8 weeks for re-evaluation  My impressions and treatment recommendations were discussed in detail with the patient who verbalized understanding and had no further questions  Discharge instructions were provided  I personally saw and examined the patient and I agree with the above discussed plan of care  No orders of the defined types were placed in this encounter  No orders of the defined types were placed in this encounter  History of Present Illness:  Ian Giraldo  is a 74 y o  male who presents for a follow up office visit in regards to Back Pain  The patients current symptoms include a pain score 5-6/10 that is constant  He describes the quality as dull aching and pressure-like with numbness in midline low back area  He does have tenderness with palpation and it does feel like his muscles are in spasm but he does not want to take any medication at this time  We did discuss different types of exercises to help relieve the spasm such as stretches and using various size balls to help alleviate the spasms        I have personally reviewed and/or updated the patient's past medical history, past surgical history, family history, social history, current medications, allergies, and vital signs today  Review of Systems   Respiratory: Negative for shortness of breath  Cardiovascular: Negative for chest pain  Gastrointestinal: Negative for constipation, diarrhea, nausea and vomiting  Musculoskeletal: Positive for back pain and gait problem  Negative for arthralgias, joint swelling and myalgias  Skin: Negative for rash  Neurological: Negative for dizziness, seizures and weakness  All other systems reviewed and are negative  Patient Active Problem List   Diagnosis    Recurrent major depressive disorder, in full remission ([facility] [address] )    Lumbar radiculopathy       History reviewed  No pertinent past medical history  Past Surgical History:   Procedure Laterality Date    [address]ENDECTOMY  1968   [address]       History reviewed  No pertinent family history  Social History     Occupational History    Not on file   Tobacco Use    Smoking status: Former Smoker    Smokeless tobacco: Never Used   Vaping Use    Vaping Use: Never used   Substance and Sexual Activity    Alcohol use: Yes    Drug use: Not on file    Sexual activity: Not on file       Current Outpatient Medications on File Prior to Visit   Medication Sig    FLUoxetine (PROzac) 20 mg capsule Take 60 mg by mouth daily    levothyroxine 25 mcg tablet Take 25 mcg by mouth daily    Red Yeast Rice Extract (RED YEAST RICE PO) Take 1 capsule by mouth daily       No current facility-administered medications on file prior to visit         No Known Allergies    Physical Exam:    /73   Pulse 59   Resp 18   Ht 6' (1 829 m)   Wt 117 kg (258 lb)   BMI 34 99 kg/m²     Constitutional:obese  Eyes:anicteric  HEENT:grossly intact  Neck:supple, symmetric, trachea midline and no masses   Pulmonary:even and unlabored  Cardiovascular:No edema or pitting edema present  Skin:Normal without rashes or lesions and well hydrated  Psychiatric:Mood and affect appropriate  Neurologic:Cranial Nerves II-XII grossly intact  Musculoskeletal:Tenderness with palpation over lumbar paraspinals with active spasm  Patient is negative for lumbar facet loading    Imaging  Study Result    Narrative & Impression   MRI LUMBAR SPINE WITHOUT CONTRAST     INDICATION: M51 [id number]: Intervertebral disc disorders with radiculopathy, lumbar region  Z98 [id number]: Other specified postprocedural states  G89 4: Chronic pain syndrome      COMPARISON:  None      TECHNIQUE:  Sagittal T1, sagittal T2, sagittal inversion recovery, axial T1 and axial T2, coronal T2     IMAGE QUALITY:  Diagnostic     FINDINGS:     VERTEBRAL BODIES:  There are 5 lumbar type vertebral bodies  Normal alignment of the lumbar spine  No spondylolysis or spondylolisthesis  No scoliosis  No compression fracture  Modic type II degenerative marrow signal L5-S1      SACRUM:  Normal signal within the sacrum  No evidence of insufficiency or stress fracture      DISTAL CORD AND CONUS:  Normal size and signal within the distal cord and conus      PARASPINAL SOFT TISSUES:  Paraspinal soft tissues are unremarkable      LOWER THORACIC DISC SPACES:  Normal disc height and signal   No disc herniation, canal stenosis or foraminal narrowing      LUMBAR DISC SPACES:     L1-L2:  Normal      L2-L3:  Normal      L3-L4:  Normal      L4-L5:  Severe facet hypertrophy without central or foraminal narrowing      L5-S1:  Left laminectomy defect  Marginal osteophytes are seen bilaterally  No significant central or foraminal narrowing      IMPRESSION:     Left laminectomy defect at L5-S1  No significant central or foraminal stenosis

## 2023-02-09 ENCOUNTER — OFFICE VISIT (OUTPATIENT)
Dept: PAIN MEDICINE | Facility: CLINIC | Age: 68
End: 2023-02-09

## 2023-02-09 VITALS
RESPIRATION RATE: 18 BRPM | WEIGHT: 249 LBS | BODY MASS INDEX: 33.72 KG/M2 | HEIGHT: 72 IN | HEART RATE: 61 BPM | SYSTOLIC BLOOD PRESSURE: 165 MMHG | DIASTOLIC BLOOD PRESSURE: 91 MMHG

## 2023-02-09 DIAGNOSIS — G89.4 CHRONIC PAIN SYNDROME: Primary | ICD-10-CM

## 2023-02-09 DIAGNOSIS — M47.816 LUMBAR SPONDYLOSIS: ICD-10-CM

## 2023-02-09 DIAGNOSIS — M62.562 ATROPHY OF MUSCLE OF LEFT LOWER LEG: ICD-10-CM

## 2023-02-09 DIAGNOSIS — M54.16 LUMBAR RADICULOPATHY: ICD-10-CM

## 2023-02-24 ENCOUNTER — HOSPITAL ENCOUNTER (OUTPATIENT)
Dept: MRI IMAGING | Facility: HOSPITAL | Age: 68
Discharge: HOME | End: 2023-02-24

## 2023-02-24 DIAGNOSIS — M54.16 LUMBAR RADICULOPATHY: ICD-10-CM

## 2023-02-24 DIAGNOSIS — M62.562 ATROPHY OF MUSCLE OF LEFT LOWER LEG: ICD-10-CM

## 2023-02-27 ENCOUNTER — TELEPHONE (OUTPATIENT)
Dept: PAIN MEDICINE | Facility: CLINIC | Age: 68
End: 2023-02-27

## 2023-02-27 NOTE — TELEPHONE ENCOUNTER
Caller: Enio(PT)    Doctor: Lakisha Kelley     Reason for call: MRI results are still PENDING and once reviewed please call pt      Call back#: [phone]

## 2023-03-01 ENCOUNTER — TELEPHONE (OUTPATIENT)
Dept: RADIOLOGY | Facility: CLINIC | Age: 68
End: 2023-03-01

## 2023-03-09 DIAGNOSIS — M62.562 LEFT CALF ATROPHY: Primary | ICD-10-CM

## 2023-03-10 NOTE — TELEPHONE ENCOUNTER
Caller: Pt    Doctor: Juan Miguel Roy     Reason for call: please reach back out to pt about his next steps  Does he need to come in? He can barley walk?     Call back#: [phone]
----- Message from Ambrocio Gaines RN sent at 3/1/2023  9:23 AM EST -----  Pt of Dr Leon Gave  ----- Message -----  From: LISA Roper  Sent: 3/1/2023   9:10 AM EST  To: [facility]    Please call patient with MRI of lumbar spine showing multilevel facet arthropathy, disc bulge that is mild at L4-5  There is some left lateral recess contacting the left S1 nerve root, which could be causing the weakness into his left lower extremity  Can you please get an update on his symptoms?   We could try a possible left-sided L5-S1 TFESI to provide him some relief, please let me know if he would like to proceed and I will place order
Attempted to reach pt  VMMLOM with CB# and OH 
Attempted to reach pt, LMOM with CB# and OH 
Caller: jenny    Doctor: Chase Waller    Reason for call: Please reach back out to patient      Call back#: [phone]
Caller:Enio STOUT    Doctor: Gerardo Yarbrough PT      Reason for call: MRI results    Call back#: [phone]
I spoke with SHIMON, we would like him to be evaluated further by Neurology for his left calf atrophy  I placed a referral  They will be able to run blood work 
LMOM for pt to CB  CB # and OH provided
LMOM for pt to CB  CB # and OH provided
Pls advise on next step 
Recommend lumbar epidural injection    Please let me know if he would like to proceed and I will place order
S/w pt, informed about MRI result of the lumbar spine  Pt states his left calf is not doing anything, it shrivels, affecting his balance and ability to walk  He has a dull throbbing pain on his mid lower back 4-5/10 most the most part  He just try to sit or stand, change position to get rid of the pain 
S/w pt, informed about epidural injection for his back, pt says his main concern is more on his mobility and ability to walk  His pain on his back is dull 5/10 and he can deal with it  Pt says he is loosing muscle mass on his calf  Pt had 6 weeks of PT before, it strengthened his knee and ankle but not my calf  He says he doesn't want to be crippled  Pt wants to know if he needs ov for this one  Pls advise 
S/w pt, informed he needs to be seen by Neurology, referral is placed  Given Eric Crystal neurology 
179.8

## 2023-05-11 ENCOUNTER — CONSULT (OUTPATIENT)
Dept: NEUROLOGY | Facility: CLINIC | Age: 68
End: 2023-05-11

## 2023-05-11 VITALS
WEIGHT: 250.7 LBS | BODY MASS INDEX: 33.96 KG/M2 | DIASTOLIC BLOOD PRESSURE: 64 MMHG | HEIGHT: 72 IN | SYSTOLIC BLOOD PRESSURE: 118 MMHG

## 2023-05-11 DIAGNOSIS — M62.562 LEFT CALF ATROPHY: Primary | ICD-10-CM

## 2023-05-11 DIAGNOSIS — G60.3 IDIOPATHIC PROGRESSIVE NEUROPATHY: ICD-10-CM

## 2023-05-11 DIAGNOSIS — M54.16 LUMBAR RADICULOPATHY: ICD-10-CM

## 2023-05-11 DIAGNOSIS — Z85.72 HISTORY OF NON-HODGKIN'S LYMPHOMA: ICD-10-CM

## 2023-05-11 DIAGNOSIS — G62.9 PERIPHERAL NEUROPATHY: ICD-10-CM

## 2023-05-11 DIAGNOSIS — G60.9 HEREDITARY AND IDIOPATHIC NEUROPATHY, UNSPECIFIED: ICD-10-CM

## 2023-05-11 NOTE — PROGRESS NOTES
Patient ID: Cassandra Rocha  is a 69 y o  male  Assessment/Plan:    Left calf atrophy  This is a 69 y o  man with history of prior L5-S1 laminectomy and discectomy in 1996 and history of NHL presenting for slowly progressive over the last 2 years  Has noticed a change in the size of his lower leg as well as difficulty with balance  No muscle fasciculations, cramps, or other muscle wasting  On exam, noted to have isolated left calf atrophy with approximately 1 5 inch difference between the left and the right side  There is also a symmetric, length-dependent sensory neuropathy with the exception of vibration which is asymmetric  This suggests a superimposed peripheral neuropathy  Strength is preserved with the exception of 4+ strength in the left hamstrings  Otherwise exam is normal    We reviewed Young's prior workup in detail today  He has had two prior MRI's since he first noticed the atrophy and there is evidence of left S1 nerve root compression  Additionally prior EMG also identified L5-S1 chronic radiculopathy with injury potentials vs active denervation in the lumbar paraspinals  These findings correlate with the exam findings noted today  With regards to further workup, recommend checking vitamin b12 and b6 levels as they have not been checked in some time  Also recommend repeating EMG to see if there is evidence of ongoing denervation, and if so, would likely need surgical intervention  Will also refer to Neurosurgery to evaluate for possible surgical intervention  We will plan on having Jory Denise follow up in clinic in approximately 3-4 months to review results and monitor exam  Advised to call with questions or concerns in the interim    Peripheral neuropathy  Evidence of a symmetric, length-dependent sensory neuropathy in the lower extremities  This correlates to prior EMG findings  Recommend checking vitamin B12 and B6 levels to complete workup   Follows closely with heme/onc for history of NHL which was "treated with rituxan and velcade  No history of diabetes or prior heavy EtOH use  Unclear cause of neuropathy, possibly related to metabolic syndrome  Symptoms are not bothersome and no indication to start treatment         Diagnoses and all orders for this visit:    Left calf atrophy  -     Ambulatory Referral to Neurology  -     EMG 2 limb lower extremity; Future    Lumbar radiculopathy  -     EMG 2 limb lower extremity; Future  -     Ambulatory referral to Neurosurgery; Future    Peripheral neuropathy  -     Vitamin B12; Future  -     Vitamin B6; Future    Idiopathic progressive neuropathy  -     Vitamin B12; Future    Hereditary and idiopathic neuropathy, unspecified  -     Vitamin B6; Future    History of non-Hodgkin's lymphoma         Subjective:    HPI    I had the pleasure of seeing your patient, Cele Murray \"Young\" Lisseth Snyder , today in the neuromuscular clinic for initial consultation regarding left calf atrophy  He was referred by Milton Mccarty NP  Back surgery in 1996, had back pain shooting down the legs, pain was mostly midline  Had great results from the surgery and had no pain afterwards  About two years ago noticed that the LLE was different in size compared to the right  Does not have pain in the legs  Has some chronic pain in the back but does not radiate down the legs  No muscle cramps, twitching, fasciculations  No numbness or tingling in the feet  Feels that balance is affected, more so this last year  He is concerned about the possibility of falling  No difficulties with speech or swallowing  No issues with bowel or bladder  Prior history of non-Hodgkin's lymphoma diagnosed in 2016  Completed 6 months of chemo with Rituxan and Velcade  Continues to follow with Dr Vera Kirkpatrick at [facility] every 6 months to monitor  Labs have been stable        Review of prior records:  - Notes from Dr Juve Nam (Pain Mgmt):   - known left-sided lumbar radiculopathy with prior lumbar surgery approximately 25 years ago at " "the level of L5-S1  - Visible atrophy of left gastroc as of 9/2021 evaluation  Recommended PT and CLEMENTINE  - 11/4/21 EMG BLE (Dr Nazario Biggs): \"Length-dependent mixed motor sensory polyneuropathy with axonal and demyelinating change  In addition changes consistent with a left L5-S1 chronic radiculopathy are noted  The acute denervation changes in the lumbar paraspinal region may be injury potentials (from previous surgery), cannot entirely exclude active denervation\"  - 12/9/21 MRI L-spine wo contrast: L4-L5 severe facet hypertrophy without central or foraminal narrowing; L5-S1 left laminectomy defect  Marginal osteophytes are seen bilaterally  No significant central or foraminal narrowing   - 2/24/23 MRI L-spine wo contrast: Redemonstrated left laminectomy at L5-S1  Heterogenous signal change in the left lateral recess contacting left S1 nerve root  Cannot differentiate disc herniation from granulation/scar tissue related to possible prior discectomy  Contras-enhanced MRI would be helpful to differentiate disc herniation from postsurgical change (if there is history of prior discectomy)  Finding is stable  There is no nerve root compression  Mild degenerative canal stenosis at level L4-L5  PMHx: lumbar radiculopathy, prediabetes, depression, low-grade B cell Non-Hodgkin's lymphoma (s/p treatment and now on surveillance)  SocHx: Former smoker (quit in 1973)    The following portions of the patient's history were reviewed and updated as appropriate: allergies, current medications, past family history, past medical history, past social history, past surgical history and problem list          Objective:    Blood pressure 118/64, height 6' (1 829 m), weight 114 kg (250 lb 11 2 oz)  Physical Exam  Vitals and nursing note reviewed  Constitutional:       General: He is not in acute distress  Appearance: Normal appearance  He is not ill-appearing  HENT:      Head: Normocephalic and atraumatic        Nose: Nose " normal       Mouth/Throat:      Mouth: Mucous membranes are moist       Pharynx: Oropharynx is clear  Eyes:      General: Lids are normal       Extraocular Movements: Nystagmus present  Conjunctiva/sclera: Conjunctivae normal       Pupils: Pupils are equal, round, and reactive to light  Cardiovascular:      Rate and Rhythm: Normal rate  Pulses: Normal pulses  Pulmonary:      Effort: Pulmonary effort is normal  No respiratory distress  Abdominal:      General: Abdomen is flat  There is no distension  Musculoskeletal:      Cervical back: Normal range of motion and neck supple  Right lower leg: No edema  Left lower leg: No edema  Skin:     General: Skin is warm and dry  Capillary Refill: Capillary refill takes less than 2 seconds  Findings: Lesion present  Comments: Irregular dark lesion approximately 2 cm in diameter on the scalp just lateral to the vertex of head  Additionally about 1 cm diameter similar lesion along left temple    Neurological:      Mental Status: He is alert  Coordination: Romberg sign positive  Deep Tendon Reflexes:      Reflex Scores:       Tricep reflexes are 2+ on the right side and 2+ on the left side  Bicep reflexes are 2+ on the right side and 2+ on the left side  Brachioradialis reflexes are 2+ on the right side and 2+ on the left side  Achilles reflexes are 0 on the right side and 0 on the left side  Psychiatric:         Mood and Affect: Mood normal          Speech: Speech normal          Behavior: Behavior normal          Neurological Exam  Mental Status  Awake, alert and oriented to person, place and time  Alert  Speech is normal  Language is fluent with no aphasia  Cranial Nerves  CN II: Visual fields full to confrontation  Right funduscopic exam: disc intact  Left funduscopic exam: disc intact    CN III, IV, VI: Nystagmus present: With glasses on, fatigable nystagmus to both the right and the left lasting about 1-3 beats  This resolves when glasses are removed    Normal lids and orbits bilaterally  Pupils equal round and reactive to light bilaterally  CN V: Facial sensation is normal   CN VII:  Right: There is no facial weakness  Left: There is no facial weakness  CN VIII: Hearing is normal   CN IX, X: Palate elevates symmetrically  CN XI:  Right: Sternocleidomastoid strength is normal  Trapezius strength is normal   Left: Sternocleidomastoid strength is normal  Trapezius strength is normal   CN XII: Tongue midline without atrophy or fasciculations  Motor  Decreased muscle bulk throughout  Unilateral reduced bulk in the LLE  Right thigh 21 5 in and left thigh 21 5 in  Right calf 15 in and left calf 13 6 in    No fasciculations present  Normal muscle tone  No abnormal involuntary movements  Strength is 5/5 in all four extremities except as noted                                               Right                     Left   Shoulder abduction               5                          5   Shoulder adduction               5                          5  Elbow flexion                         5                          5  Elbow extension                    5                          5  Hip flexion                              5                          5  Hip extension                         5                          5  Knee flexion                           5                          4+  Knee extension                      5                          5  Ankle inversion                      5                          5  Ankle eversion                       5                          5  Plantarflexion                         5                          5  Dorsiflexion                            5                          5  Toe flexion                             5                          5  Toe extension                        5                          5    Sensory  Light touch abnormality: Pinprick abnormality: Temperature abnormality: Vibration abnormality: Proprioception abnormality:   Symmetrical decrement to both temperature and pinprick from just above the ankle and distally  Vibration is 8-9 seconds at right great toe IP joint  On the left, vibration is absent at the great toe IP and MCP joint, only 5 seconds at the knee       Reflexes                                            Right                      Left  Brachioradialis                    2+                         2+  Biceps                                 2+                         2+  Triceps                                2+                         2+  Patellar                                Tr                         Tr  Achilles                                0                         0  Plantar                           Downgoing                Downgoing    Coordination  Right: Finger-to-nose normal  Rapid alternating movement normal Left: Finger-to-nose normal  Rapid alternating movement normal     Gait  Casual gait: Normal stance  Reduced stride length  cautious  Toe walking abnormality: Unable to elevate on to toes on the left leg only    Romberg is present  Able to rise from chair without using arms  ROS:  Review of systems as documented by the MA was reviewed in full by myself, Komal Floyd DO  Review of Systems   Constitutional: Negative  Negative for appetite change and fever  HENT: Negative  Negative for hearing loss, tinnitus, trouble swallowing and voice change  Eyes: Negative  Negative for photophobia, pain and visual disturbance  Respiratory: Negative  Negative for shortness of breath  Cardiovascular: Negative  Negative for palpitations  Gastrointestinal: Negative  Negative for nausea and vomiting  Endocrine: Negative  Negative for cold intolerance  Genitourinary: Negative  Negative for dysuria, frequency and urgency  Musculoskeletal: Negative  Negative for gait problem, myalgias and neck pain     Skin: Negative  Negative for rash  Allergic/Immunologic: Negative  Neurological: Negative  Negative for dizziness, tremors, seizures, syncope, facial asymmetry, speech difficulty, weakness, light-headedness, numbness and headaches  Hematological: Negative  Does not bruise/bleed easily  Psychiatric/Behavioral: Negative  Negative for confusion, hallucinations and sleep disturbance      ======    I have discussed the patient's history, physical exam findings, assessment, and plan in detail with attending, Dr Zulma Cueva    Thank you for allowing me to participate in the care of your patient, DO [facility], PGY-3

## 2023-05-11 NOTE — ASSESSMENT & PLAN NOTE
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

## 2023-05-11 NOTE — ASSESSMENT & PLAN NOTE
This is a 69 y o  man with history of prior L5-S1 laminectomy and discectomy in 1996 and history of NHL presenting for slowly progressive over the last 2 years  Has noticed a change in the size of his lower leg as well as difficulty with balance  No muscle fasciculations, cramps, or other muscle wasting  On exam, noted to have isolated left calf atrophy with approximately 1 5 inch difference between the left and the right side  There is also a symmetric, length-dependent sensory neuropathy with the exception of vibration which is asymmetric  This suggests a superimposed peripheral neuropathy  Strength is preserved with the exception of 4+ strength in the left hamstrings  Otherwise exam is normal    We reviewed Young's prior workup in detail today  He has had two prior MRI's since he first noticed the atrophy and there is evidence of left S1 nerve root compression  Additionally prior EMG also identified L5-S1 chronic radiculopathy with injury potentials vs active denervation in the lumbar paraspinals  These findings correlate with the exam findings noted today  With regards to further workup, recommend checking vitamin b12 and b6 levels as they have not been checked in some time  Also recommend repeating EMG to see if there is evidence of ongoing denervation, and if so, would likely need surgical intervention  Will also refer to Neurosurgery to evaluate for possible surgical intervention   We will plan on having Gogo Jung follow up in clinic in approximately 3-4 months to review results and monitor exam  Advised to call with questions or concerns in the interim

## 2023-05-11 NOTE — PATIENT INSTRUCTIONS
Please get blood work at your convenience   This is to check for vitamin deficiencies  We will schedule you for EMG with Dr Mni De Leon  Follow up in 3-4 months

## 2023-05-16 ENCOUNTER — CONSULT (OUTPATIENT)
Dept: NEUROSURGERY | Facility: CLINIC | Age: 68
End: 2023-05-16

## 2023-05-16 ENCOUNTER — APPOINTMENT (OUTPATIENT)
Dept: LAB | Facility: CLINIC | Age: 68
End: 2023-05-16

## 2023-05-16 VITALS
HEART RATE: 58 BPM | HEIGHT: 72 IN | BODY MASS INDEX: 33.86 KG/M2 | RESPIRATION RATE: 19 BRPM | DIASTOLIC BLOOD PRESSURE: 60 MMHG | TEMPERATURE: 97.4 F | SYSTOLIC BLOOD PRESSURE: 102 MMHG | WEIGHT: 250 LBS

## 2023-05-16 DIAGNOSIS — M62.562 LEFT CALF ATROPHY: ICD-10-CM

## 2023-05-16 DIAGNOSIS — M54.16 LUMBAR RADICULOPATHY: ICD-10-CM

## 2023-05-16 DIAGNOSIS — M62.562 LEFT CALF ATROPHY: Primary | ICD-10-CM

## 2023-05-16 LAB
BUN SERPL-MCNC: 11 MG/DL (ref 5–25)
CREAT SERPL-MCNC: 0.99 MG/DL (ref 0.6–1.3)
GFR SERPL CREATININE-BSD FRML MDRD: 77 ML/MIN/1.73SQ M

## 2023-05-16 RX ORDER — DIPHENOXYLATE HYDROCHLORIDE AND ATROPINE SULFATE 2.5; .025 MG/1; MG/1
1 TABLET ORAL DAILY
COMMUNITY

## 2023-05-16 NOTE — PROGRESS NOTES
"Office Note - Neurosurgery   Mendel Ricketts  69 y o  male MRN: [id number]      Assessment:    Patient is a 69 yrs old gentleman with MDD, non Hodgkin's lymphoma, and s/p Left L5-S1 microdiskectomy about 26 yrs ago, referred by a neurology service for evaluation of progressive left distal leg weakness, gait issues and left calf atrophy  Patient reports his symptoms started more than 2-3 yrs ago and became worse over time  No B/B dysfunction or saddle anaesthesia  He has mild to moderate lower back pain, denies radicular pain, occasional numbness in the left calf region  Patient tried PT and NANCY without much improvement in his left distal leg weakness, walking difficulty and gait issues, uses cane for ambulation  Exam-A&OX3, [place], left Calf atrophy with decreased muscle bulk, difficulty standing on toes tips  Slight numbness on the left calf  Otherwise DF/PF 5/5 bilaterally  DTR 2+, no clonus bilaterally  Hx, PEx and images reviewed with the patient  Mx plan discussed  Patient with chronic left calf atrophy and weakness, MRI shows either granulation/scar tissue or disk at left L5-S1  Image was done without contrast, ordered MRI of Lumbar spine w/wo contrast   Advised to continue PT  F/U after images results  All questions and concerns were answered to patient's satisfaction  Patient expressed his understandings and agreed with the plan  Plan:  1  MRI of Lumbar spine w/wo contrast  2  BUN & Cr  3  Continue PT, exercise  4  F/U after images result      Subjective/Objective     C/C: \" referred by Neurology for eval of  Left distal leg weakness, calf atrophy\"        HPI    Patient is a 69 yrs old gentleman with MDD, non Hodgkin's lymphoma, and s/p Left L5-S1 microdiskectomy about 26 yrs ago, referred by a neurology service for evaluation of progressive left distal leg weakness, gait issues and left calf atrophy  Patient reports his symptoms started more than 2-3 yrs ago and became worse over time   No B/B " dysfunction or saddle anaesthesia  He has mild to moderate lower back pain, denies radicular pain, occasional numbness in the left calf region  Patient tried PT and NANCY without much improvement in his left distal leg weakness, walking difficulty and gait issues, uses cane for ambulation  He denies Hx of fever, chills, rigors, cough or chest pain  Patient denies Hx of DM, HTN, CHF, Stroke, seizures, bleeding disorder or taking anticoagulant meds  No Hx of smoking cigarettes or drinking alcohol  ROS  ROS personally reviewed and updated  Review of Systems   Musculoskeletal: Positive for back pain (Throbbing x years) and gait problem  Previous spine-patient cannot recall if this was purely diskectomy or also with laminectomy at L5-S1 approximately 25 years ago    Mid Back pain w L side numbness and weakness and difficult walk x 2 yrs   Left Calf Atrophy     Meds none  PT w [facility] from jan-feb 2023  NANCY left-sided L5-S1 and S1 TFESI 1/20/22 (90% improved )  PM ov 2/9/23    h/o Stage Lymphoma     Neurological: Positive for weakness and numbness  Consult Neurology 5/11/23   All other systems reviewed and are negative  Family History    No family history on file  Social History    Social History     Socioeconomic History   • Marital status: Unknown     Spouse name: Not on file   • Number of children: Not on file   • Years of education: Not on file   • Highest education level: Not on file   Occupational History   • Not on file   Tobacco Use   • Smoking status: Former   • Smokeless tobacco: Never   Vaping Use   • Vaping Use: Never used   Substance and Sexual Activity   • Alcohol use:  Yes   • Drug use: Not on file   • Sexual activity: Not on file   Other Topics Concern   • Not on file   Social History Narrative   • Not on file     Social Determinants of Health     Financial Resource Strain: Not on file   Food Insecurity: Not on file   Transportation Needs: Not on file   Physical Activity: Not on file Stress: Not on file   Social Connections: Not on file   Intimate Partner Violence: Not on file   Housing Stability: Not on file       Past Medical History    No past medical history on file  Surgical History    Past Surgical History:   Procedure Laterality Date   • APPENDECTOMY  1968   • LUMBAR SPINE SURGERY  1996       Medications      Current Outpatient Medications:   •  FLUoxetine (PROzac) 20 mg capsule, Take 60 mg by mouth daily, Disp: , Rfl:   •  levothyroxine 25 mcg tablet, Take 25 mcg by mouth daily (Patient not taking: Reported on 5/11/2023), Disp: , Rfl:   •  Red Yeast Rice Extract (RED YEAST RICE PO), Take 1 capsule by mouth daily   (Patient not taking: Reported on 5/11/2023), Disp: , Rfl:     Allergies    No Known Allergies    The following portions of the patient's history were reviewed and updated as appropriate: allergies, current medications, past family history, past medical history, past social history, past surgical history and problem list     Investigations    I personally reviewed the MRI results with the patient:      Physical Exam    There were no vitals filed for this visit  Physical Exam  Constitutional:       Appearance: He is obese  HENT:      Head: Atraumatic  Cardiovascular:      Rate and Rhythm: Normal rate  Pulses: Normal pulses  Pulmonary:      Effort: Pulmonary effort is normal    Musculoskeletal:         General: Tenderness present  Cervical back: Normal range of motion  Neurological:      Mental Status: He is alert and oriented to person, place, and time  GCS: GCS eye subscore is 4  GCS verbal subscore is 5  GCS motor subscore is 6  Cranial Nerves: Cranial nerves 2-12 are intact  Sensory: Sensation is intact  Motor: Weakness present  Coordination: Finger-Nose-Finger Test normal       Deep Tendon Reflexes: Reflexes are normal and symmetric  Reflex Scores:       Tricep reflexes are 2+ on the right side and 2+ on the left side  Bicep reflexes are 2+ on the right side and 2+ on the left side  Brachioradialis reflexes are 2+ on the right side and 2+ on the left side  Patellar reflexes are 2+ on the right side and 2+ on the left side  Achilles reflexes are 2+ on the right side and 2+ on the left side  Psychiatric:         Speech: Speech normal        Neurologic Exam     Mental Status   Oriented to person, place, and time  Speech: speech is normal   Level of consciousness: alert    Cranial Nerves   Cranial nerves II through XII intact       CN III, IV, VI   Nystagmus: none     CN XI   CN XI normal      Motor Exam   Muscle bulk: normal  Overall muscle tone: normal  Right arm tone: normal  Left arm tone: normal  Right arm pronator drift: absent  Left arm pronator drift: absent  Right leg tone: normal  Left leg tone: normal    Sensory Exam   Right arm light touch: normal  Left arm light touch: normal  Right leg light touch: normal  Left leg light touch: decreased from knee    Gait, Coordination, and Reflexes     Coordination   Finger to nose coordination: normal    Reflexes   Right brachioradialis: 2+  Left brachioradialis: 2+  Right biceps: 2+  Left biceps: 2+  Right triceps: 2+  Left triceps: 2+  Right patellar: 2+  Left patellar: 2+  Right achilles: 2+  Left achilles: 2+  Right : 2+  Left : 2+  Right Connolly: absent  Left Connolly: absent  Right ankle clonus: absent  Left pendular knee jerk: absent

## 2023-06-03 ENCOUNTER — HOSPITAL ENCOUNTER (OUTPATIENT)
Dept: MRI IMAGING | Facility: HOSPITAL | Age: 68
Discharge: HOME | End: 2023-06-03
Payer: MEDICARE

## 2023-06-03 DIAGNOSIS — M62.562 LEFT CALF ATROPHY: ICD-10-CM

## 2023-06-03 DIAGNOSIS — M54.16 LUMBAR RADICULOPATHY: ICD-10-CM

## 2023-06-03 PROCEDURE — A9585 GADOBUTROL INJECTION: HCPCS | Performed by: PHYSICIAN ASSISTANT

## 2023-06-03 PROCEDURE — 72158 MRI LUMBAR SPINE W/O & W/DYE: CPT

## 2023-06-03 RX ADMIN — GADOBUTROL 11 ML: 604.72 INJECTION INTRAVENOUS at 09:33

## 2023-06-12 ENCOUNTER — OFFICE VISIT (OUTPATIENT)
Dept: NEUROSURGERY | Facility: CLINIC | Age: 68
End: 2023-06-12
Payer: MEDICARE

## 2023-06-12 VITALS
BODY MASS INDEX: 33.32 KG/M2 | TEMPERATURE: 97.2 F | HEIGHT: 72 IN | DIASTOLIC BLOOD PRESSURE: 68 MMHG | HEART RATE: 52 BPM | SYSTOLIC BLOOD PRESSURE: 104 MMHG | RESPIRATION RATE: 18 BRPM | WEIGHT: 246 LBS

## 2023-06-12 DIAGNOSIS — M62.562 LEFT CALF ATROPHY: Primary | ICD-10-CM

## 2023-06-12 PROCEDURE — 99214 OFFICE O/P EST MOD 30 MIN: CPT | Performed by: PHYSICIAN ASSISTANT

## 2023-06-12 NOTE — PROGRESS NOTES
Neurosurgery Office Note  Darrius Sheth  69 y o  male MRN: [id number]      Assessment/Plan     Left calf atrophy  · Presents for follow up evaluation regarding his L calf atrophy  · Progressing over last 2 years with worsening walking difficulty  · No other reported symptoms     Imaging:   · MRI lumbar spine 6/3/2023: Stable appearance of postoperative changes at L5-S1 on the left status post laminectomy  Heterogeneous signal within the left paramedian epidural space abutting the aspect of the S1 nerve root without significant compression or displacement  This has the appearance of combination of mild epidural fibrosis/granulation tissue  Plan:   · Continue to monitor symptoms   · Patient previously evaluated by neurology and recommended neurosurgery evaluation and repeat EMG   · No evidence of EMG being scheduled at this time  · Reviewed imaging with patient in room  · MRI with L5-S1 post op changes  Some scar tissue surrounding the S1 nerve root without compression or displacement   · Given MRI findings and no evidence of weakness, radiculopathy or sensation deficits, S1 nerve root being cause for calf atrophy is unlikely at this time  · Could have intrinsic injury to S1 contributing to the cause, but no active nerve compression amenable to surgery    · Recommend returning to neurology for completion of EMG  Should compare result to prior EMG from 2021  · Patient likely always to have chronic L5-S1 radiculopathy on EMG given prior surgery for stenosis at this time, although this is not indicative of active nerve compression  · Continue to be active with walking and take precautions for ambulatory dysfunction  · No surgical intervention to offer  · Follow up as needed  Encouraged to call with questions or concerns       There are no diagnoses linked to this encounter        I have spent a total time of 30 minutes on 06/12/23 in caring for this patient including Diagnostic results, Risks and benefits of tx options, Patient and family education, Risk factor reductions, Counseling / Coordination of care, Documenting in the medical record, Reviewing / ordering tests, medicine, procedures   and Obtaining or reviewing history    CHIEF COMPLAINT    Chief Complaint   Patient presents with   • Follow-up     4 week follow up -lumabr radiculopathy       HISTORY    History of Present Illness     69y o  year old male who presents to the outpatient neurosurgical office for evaluation of his left calf atrophy  He was originally referred to [facility] by neurology  Previous MRI had concern for S1 nerve compression  He has a prior history of an L5-S1 laminectomy discectomy in 1996  States up until about 2 years ago he started to notice some atrophy of his left leg  Its isolated to the calf  He denies any radicular pain, sensation changes or weakness  He has full strength in the left leg including dorsiflexion/plantarflexion  His only complaint associated with the atrophy is ambulatory dysfunction  He has trouble maintaining balance and weightbearing at times in the left leg  When hiking he is using walking sticks more regularly  He remains active walking his dog daily but states he cannot ambulate as far as he used to do  His wife was present for the evaluation  See Discussion    REVIEW OF SYSTEMS    Review of Systems   Musculoskeletal: Positive for back pain (Throbbing x years) and gait problem          4 week follow up -lumabr radiculopathy  6/3/23-mri lumbar spine completed  MID LBP w some numbness- pt c/o difficult walk due to Left Leg atrophy           Previous spine-patient cannot recall if this was purely diskectomy or also with laminectomy at L5-S1 approximately 25 years ago    Mid Back pain w L side numbness and weakness and difficult walk x 2 yrs   Left Calf Atrophy     Meds none  PT w [facility] from jan-feb 2023  NANCY left-sided L5-S1 and S1 TFESI 1/20/22 (90% improved )  PM ov 2/9/23    h/o Stage Lymphoma     Neurological: Positive for weakness and numbness  Consult Neurology 5/11/23   All other systems reviewed and are negative  ROS obtained by MA  Reviewed  See HPI  Meds/Allergies     Current Outpatient Medications   Medication Sig Dispense Refill   • FLUoxetine (PROzac) 20 mg capsule Take 40 mg by mouth daily     • multivitamin (THERAGRAN) TABS Take 1 tablet by mouth daily     • levothyroxine 25 mcg tablet Take 25 mcg by mouth daily (Patient not taking: Reported on 6/12/2023)     • Red Yeast Rice Extract (RED YEAST RICE PO) Take 1 capsule by mouth daily   (Patient not taking: Reported on 5/11/2023)       No current facility-administered medications for this visit  No Known Allergies    PAST HISTORY    No past medical history on file  Past Surgical History:   Procedure Laterality Date   • APPENDECTOMY  1968   • LUMBAR SPINE SURGERY  1996       Social History     Tobacco Use   • Smoking status: Former   • Smokeless tobacco: Never   Vaping Use   • Vaping Use: Never used   Substance Use Topics   • Alcohol use: Yes   • Drug use: Never       No family history on file  Above history personally reviewed  EXAM    Vitals:Blood pressure 104/68, pulse (!) 52, temperature (!) 97 2 °F (36 2 °C), temperature source Temporal, resp  rate 18, height 6' (1 829 m), weight 112 kg (246 lb)  ,Body mass index is 33 36 kg/m²  Physical Exam  Constitutional:       Appearance: Normal appearance  HENT:      Head: Normocephalic  Eyes:      Extraocular Movements: Extraocular movements intact and EOM normal    Musculoskeletal:         General: Deformity (L calf atrophy  Obvious assymmety of the distal LLE compared to the RLE ) present  Normal range of motion  Cervical back: Normal range of motion  Skin:     General: Skin is warm and dry  Neurological:      Mental Status: He is alert and oriented to person, place, and time        Cranial Nerves: No cranial nerve deficit  Sensory: No sensory deficit  Motor: No weakness  Gait: Gait is intact  Deep Tendon Reflexes:      Reflex Scores:       Patellar reflexes are 1+ on the right side and 1+ on the left side  Psychiatric:         Speech: Speech normal          Neurologic Exam     Mental Status   Oriented to person, place, and time  Attention: normal  Concentration: normal    Speech: speech is normal   Level of consciousness: alert  Normal comprehension  Cranial Nerves     CN III, IV, VI   Extraocular motions are normal      CN VII   Facial expression full, symmetric  CN VIII   CN VIII normal    Hearing: intact    Motor Exam 5/5 strength testing throughout  No focal weakness with particular attention to the LLE  Sensory Exam   Light touch normal    Right arm light touch: normal  Left arm light touch: normal  Right leg light touch: normal  Left leg light touch: normal    No decreased sensation in the LLE compared to the R  Sensation symmetric      Gait, Coordination, and Reflexes     Gait  Gait: normal    Tremor   Resting tremor: absent  Action tremor: absent    Reflexes   Right patellar: 1+  Left patellar: 1+  Right ankle clonus: absent  Left ankle clonus: absent    MEDICAL DECISION MAKING    Imaging Studies:     MRI lumbar spine with and without contrast    Result Date: 6/8/2023  Narrative: MRI LUMBAR SPINE WITH AND WITHOUT CONTRAST INDICATION: M54 16: Radiculopathy, lumbar region M62 562: Muscle wasting and atrophy, not elsewhere classified, left lower leg  COMPARISON: 2/24/2023 TECHNIQUE:  Multiplanar, multisequence imaging of the lumbar spine was performed before and after gadolinium administration    IV Contrast:  11 mL of Gadobutrol injection (SINGLE-DOSE) IMAGE QUALITY:  Diagnostic FINDINGS: VERTEBRAL BODIES:  There are 5 lumbar type vertebral bodies  There is mild smooth dextroscoliosis of the thoracolumbar spine and slight compensatory levoscoliosis at the lumbosacral junction   No compression fracture  No spondylolisthesis or spondylolysis  Type II fatty endplate marrow degenerative change noted at the L5-S1 level  SACRUM:  Normal signal within the sacrum  No evidence of insufficiency or stress fracture  DISTAL CORD AND CONUS:  Normal size and signal within the distal cord and conus  PARASPINAL SOFT TISSUES:  Paraspinal soft tissues are unremarkable  LOWER THORACIC DISC SPACES:  Normal disc height and signal   No disc herniation, canal stenosis or foraminal narrowing  LUMBAR DISC SPACES: L1-L2:  Normal  L2-L3:  Normal  L3-L4:  Normal  L4-L5: Disc desiccation without loss of disc height  Minimal annular bulging with no disc herniation, canal stenosis or foraminal narrowing  There is slight facet arthropathy present  L5-S1: Disc desiccation and loss of disc height  Annular bulging is present  There has been prior left laminectomy  Abnormal soft tissue is seen in the left paramedian anterior epidural space abutting the ventral aspect of the S1 nerve  This demonstrates  minimal enhancement and suggests a combination of mild epidural fibrosis/granulation tissue extending to the disc margin with small residual disc herniation and mild adjacent endplate hypertrophic change  The S1 nerve does not appear to be significantly  compressed or displaced  There is no significant canal stenosis  There is mild left foraminal narrowing  POSTCONTRAST IMAGING: Other than the mild epidural fibrosis demonstrating mild enhancement at the L5-S1 level to the left of midline, there is no abnormal enhancement identified  OTHER FINDINGS: There is a parapelvic cyst identified within the mid to upper portion of the right kidney  This is unchanged  Impression: Stable appearance of postoperative change at the L5-S1 level on the left, post left laminectomy   Heterogeneous signal within the left paramedian anterior epidural space abutting the ventral aspect of the S1 nerve without significant compression or displacement, has the appearance of a combination of mild epidural fibrosis/granulation tissue as well as small residual disc herniation and associated endplate hypertrophic change  Workstation performed: [id number]       I have personally reviewed pertinent reports     and I have personally reviewed pertinent films in PACS

## 2023-06-12 NOTE — ASSESSMENT & PLAN NOTE
· Presents for follow up evaluation regarding his L calf atrophy  · Progressing over last 2 years with worsening walking difficulty  · No other reported symptoms     Imaging:   · MRI lumbar spine 6/3/2023: Stable appearance of postoperative changes at L5-S1 on the left status post laminectomy  Heterogeneous signal within the left paramedian epidural space abutting the aspect of the S1 nerve root without significant compression or displacement  This has the appearance of combination of mild epidural fibrosis/granulation tissue  Plan:   · Continue to monitor symptoms   · Patient previously evaluated by neurology and recommended neurosurgery evaluation and repeat EMG   · No evidence of EMG being scheduled at this time  · Reviewed imaging with patient in room  · MRI with L5-S1 post op changes  Some scar tissue surrounding the S1 nerve root without compression or displacement   · Given MRI findings and no evidence of weakness, radiculopathy or sensation deficits, S1 nerve root being cause for calf atrophy is unlikely at this time  · Could have intrinsic injury to S1 contributing to the cause, but no active nerve compression amenable to surgery    · Recommend returning to neurology for completion of EMG  Should compare result to prior EMG from 2021  · Patient likely always to have chronic L5-S1 radiculopathy on EMG given prior surgery for stenosis at this time, although this is not indicative of active nerve compression  · Continue to be active with walking and take precautions for ambulatory dysfunction  · No surgical intervention to offer  · Follow up as needed   Encouraged to call with questions or concerns

## 2023-07-17 ENCOUNTER — HOSPITAL ENCOUNTER (OUTPATIENT)
Dept: NEUROLOGY | Facility: CLINIC | Age: 68
Discharge: HOME | End: 2023-07-17
Payer: MEDICARE

## 2023-07-17 DIAGNOSIS — M54.16 LUMBAR RADICULOPATHY: ICD-10-CM

## 2023-07-17 DIAGNOSIS — M62.562 LEFT CALF ATROPHY: ICD-10-CM

## 2023-07-17 PROCEDURE — 95911 NRV CNDJ TEST 9-10 STUDIES: CPT | Performed by: PSYCHIATRY & NEUROLOGY

## 2023-07-17 PROCEDURE — 95886 MUSC TEST DONE W/N TEST COMP: CPT | Performed by: PSYCHIATRY & NEUROLOGY

## 2023-09-28 ENCOUNTER — OFFICE VISIT (OUTPATIENT)
Dept: NEUROLOGY | Facility: CLINIC | Age: 68
End: 2023-09-28
Payer: MEDICARE

## 2023-09-28 VITALS
TEMPERATURE: 97.6 F | DIASTOLIC BLOOD PRESSURE: 79 MMHG | WEIGHT: 248 LBS | HEIGHT: 72 IN | SYSTOLIC BLOOD PRESSURE: 171 MMHG | BODY MASS INDEX: 33.59 KG/M2 | HEART RATE: 63 BPM

## 2023-09-28 DIAGNOSIS — G62.9 PERIPHERAL NEUROPATHY: ICD-10-CM

## 2023-09-28 DIAGNOSIS — M54.16 LUMBAR RADICULOPATHY: Primary | ICD-10-CM

## 2023-09-28 DIAGNOSIS — M62.562 LEFT CALF ATROPHY: ICD-10-CM

## 2023-09-28 PROCEDURE — 99214 OFFICE O/P EST MOD 30 MIN: CPT | Performed by: PSYCHIATRY & NEUROLOGY

## 2023-09-28 RX ORDER — ROSUVASTATIN CALCIUM 5 MG/1
TABLET, COATED ORAL
COMMUNITY
Start: 2023-07-31

## 2023-09-28 RX ORDER — FLUOXETINE HYDROCHLORIDE 40 MG/1
40 CAPSULE ORAL DAILY
COMMUNITY
Start: 2023-07-31

## 2023-09-28 NOTE — PROGRESS NOTES
Patient ID: Michelle Negron is a 69 y.o. male. Assessment/Plan:    Left calf atrophy  Overall, this patient has had gradually progressive weakness and loss of muscle bulk in the left lower extremity, predominantly in the calf muscles, symptoms most consistent with a S1 radiculopathy, possibly from scar tissue from his prior surgery with loss of motor neurons in the leg with aging. 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 Reassured him I do not think this is a progressive neurodegenerative disease, reviewed his spine imaging with him and his wife again today, patient has already been evaluated by neurosurgery, and was not recommended to have any surgical intervention. Encouraged him to remain physically active and exercise these muscles to maintain the strength, and function, fall precautions were discussed. He enjoys hiking, which was encouraged, however recommended to start using walking sticks for uneven grounds which he understands and willing to do. Does not have much in the way of pain or dysesthesias to consider any neuropathic pain medications. He will return for a follow-up with me in 6 to 8 months to ensure stability. Thank you following me to participate in his care. Diagnoses and all orders for this visit:    Lumbar radiculopathy    Peripheral neuropathy    Left calf atrophy    Other orders  -     FLUoxetine (PROzac) 40 MG capsule;  Take 40 mg by mouth daily  -     rosuvastatin (CRESTOR) 5 mg tablet; TAKE 1 TABLET BY MOUTH EVERY DAY AT NIGHT (Patient not taking: Reported on 9/28/2023)         I have spent a total time of 30 minutes on 09/29/23 in caring for this patient including Patient and family education, Documenting in the medical record, Reviewing / ordering tests, medicine, procedures   and Obtaining or reviewing history  . Subjective:    HPI    I had the pleasure of seeing your patient in neurology clinic for neuromuscular follow-up. As you know, patient is a 49-year-old man who was referred to us for left calf atrophy. He was last seen by me in May 2023, now returns for follow-up. As you recall, patient has history of lumbar spine surgery in 1996, at the time he had lower back pain with radicular symptoms to the left lower extremity. He reports lower back pain continues to bother him over the years, does not have much in the way of radicular symptoms in either left or right lower extremity. He started noticing decreased bulk in the calf muscles of the left lower extremity about 3 years ago, and has noted gradual loss of muscle bulk since then. She denies any pain in the left lower extremity, balance continues to be affected and gradually getting worse, he often finds himself catching and stumbling. He denies any numbness in either of his feet, no symptoms in the right lower extremity. Denies any muscle spasms or cramps, no ocular or bulbar symptoms. He is unable to stand on tiptoes anymore, especially on the left lower extremity. Besides this, he does have history of non-Hodgkin's lymphoma has been treated with rituximab, continues to follow with hematology and has been stable. Since his last evaluation, patient reports slight decline, specifically balance has gotten worse, he denies any major falls, but has had near falls. He denies much in the way of pain, denies any significant numbness or tingling, no symptoms in the hands, no weakness in the right lower extremity, no speech or swallowing difficulty.     He had EMG with me in July , Study was reviewed with the patient again today, sensory responses were mildly reduced, but can be considered normal for this patient's age, in addition bilateral peroneal and tibial motor responses were reduced with normal to prolonged latencies, normal to mild slowing of conduction velocities. Peroneal motor responses from tibialis anterior muscles were normal.  Needle examination showed chronic neurogenic motor unit action potentials in the left L5-S1 innervated muscles, and the right S1 innervated muscles, with ongoing denervation in bilateral gastrocnemius muscle. These findings are most consistent with bilateral chronic lumbar radiculopathies, affecting the L5-S1 myotome on the left, and S1 myotome on the right, with ongoing denervation in bilateral S1 innervated muscles with mild underlying axonal neuropathy. He was evaluated by our neurosurgery colleagues as well, had MRI of the lumbar spine with and without contrast in June 2023, images were personally reviewed by me as a part of this evaluation today, has postoperative changes at L5-S1 on the left, post left laminectomy, heterogeneous signal was reported within the left paramedian anterior epidural space abutting the ventral aspect of S1 nerve without significant compression or displacement, has the appearance of combination of mild epidural fibrosis/granulation tissue as well as small residual disc herniation and associated endplate hypertrophic change. Mild degenerative changes were also noted at L4-5, without significant canal stenosis or foraminal narrowing. MRI lumbar spine without contrast February 2023:    Redemonstrated left laminectomy at L5-S1. Heterogeneous signal change in the left lateral recess contacting left S1 nerve root. Cannot differentiate disc herniation from granulation/scar tissue related to possible prior discectomy. Contrast-enhanced MRI would be helpful to differentiate disc herniation from postsurgical change (If there is history of prior discectomy). Finding is stable. There is no nerve root compression. Mild degenerative canal stenosis at level L4-5.     Labs July 2023 hemoglobin A1c 6.1, CMP normal, TSH 5.99 with normal free T4,       The following portions of the patient's history were reviewed and updated as appropriate: allergies, current medications, past family history, past medical history, past social history, past surgical history and problem list.         Objective:    Blood pressure (!) 171/79, pulse 63, temperature 97.6 °F (36.4 °C), temperature source Tympanic, height 6' (1.829 m), weight 112 kg (248 lb). Physical Exam  On general exam, patient was not in any acute distress  HEENT was unremarkable  Extremities did not reveal any edema. Neurological Exam  Neurologically, patient was awake and alert, speech was fluent without any dysarthria  Cranial examination did not reveal any ptosis, normal extraocular movements, normal strength of eye closure muscles, no facial asymmetry, no facial weakness, tongue was midline without atrophy or fasciculations. On motor examination, muscle bulk was normal in both upper and right lower extremities, muscle bulk was clearly reduced in the left lower leg, specifically in the posterior compartment. Bulk was symmetric in the left thigh. Strength testing revealed normal strength in neck flexors, extensors, and all muscle groups in the both upper and right lower extremities. In the left lower extremity, ankle plantar flexors were graded as 4+ as well as the left hamstrings, remainder of the muscles were full strength. Sensory examination revealed mild length dependent sensory loss to pinprick and temperature up to ankles bilaterally, vibration was 8 to 9 seconds at the right big toe, vibration was absent at the left toes, proprioception was relatively intact. Reflexes were 2+ in the upper extremities, trace at the knees, absent at the ankles, toes were downgoing. Patient was unable to stand on his toes on the left leg, was able to do so on the right. ROS:  I reviewed the below ROS and what is mentioned in HPI, the remainder of ROS was negative.     Review of Systems   Constitutional: Negative for appetite change and fever.   HENT: Negative. Negative for hearing loss, tinnitus, trouble swallowing and voice change. Eyes: Negative. Negative for photophobia and pain. Respiratory: Negative. Negative for shortness of breath. Cardiovascular: Negative. Negative for palpitations. Gastrointestinal: Negative. Negative for nausea and vomiting. Endocrine: Negative. Negative for cold intolerance. Genitourinary: Negative. Negative for dysuria, frequency and urgency. Musculoskeletal: Negative. Negative for myalgias and neck pain. Skin: Negative. Negative for rash. Neurological: Negative. Negative for dizziness, tremors, seizures, syncope, facial asymmetry, speech difficulty, weakness, light-headedness, numbness and headaches. Trouble with balance   Hematological: Negative. Does not bruise/bleed easily. Psychiatric/Behavioral: Negative. Negative for confusion, hallucinations and sleep disturbance.

## 2023-09-29 NOTE — ASSESSMENT & PLAN NOTE
Overall, this patient has had gradually progressive weakness and loss of muscle bulk in the left lower extremity, predominantly in the calf muscles, symptoms most consistent with a S1 radiculopathy, possibly from scar tissue from his prior surgery with loss of motor neurons in the leg with aging. 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 Reassured him I do not think this is a progressive neurodegenerative disease, reviewed his spine imaging with him and his wife again today, patient has already been evaluated by neurosurgery, and was not recommended to have any surgical intervention. Encouraged him to remain physically active and exercise these muscles to maintain the strength, and function, fall precautions were discussed. He enjoys hiking, which was encouraged, however recommended to start using walking sticks for uneven grounds which he understands and willing to do. Does not have much in the way of pain or dysesthesias to consider any neuropathic pain medications. He will return for a follow-up with me in 6 to 8 months to ensure stability. Thank you following me to participate in his care.

## 2024-06-19 ENCOUNTER — TELEPHONE (OUTPATIENT)
Dept: NEUROLOGY | Facility: CLINIC | Age: 69
End: 2024-06-19

## 2024-06-27 ENCOUNTER — OFFICE VISIT (OUTPATIENT)
Dept: NEUROLOGY | Facility: CLINIC | Age: 69
End: 2024-06-27
Payer: MEDICARE

## 2024-06-27 VITALS
WEIGHT: 238.5 LBS | BODY MASS INDEX: 32.3 KG/M2 | TEMPERATURE: 98.2 F | OXYGEN SATURATION: 98 % | HEIGHT: 72 IN | HEART RATE: 61 BPM | SYSTOLIC BLOOD PRESSURE: 126 MMHG | DIASTOLIC BLOOD PRESSURE: 68 MMHG

## 2024-06-27 DIAGNOSIS — M62.562 LEFT CALF ATROPHY: Primary | ICD-10-CM

## 2024-06-27 DIAGNOSIS — M54.16 LUMBAR RADICULOPATHY: ICD-10-CM

## 2024-06-27 DIAGNOSIS — G62.9 PERIPHERAL POLYNEUROPATHY: ICD-10-CM

## 2024-06-27 PROCEDURE — 99214 OFFICE O/P EST MOD 30 MIN: CPT | Performed by: PSYCHIATRY & NEUROLOGY

## 2024-06-27 PROCEDURE — G2211 COMPLEX E/M VISIT ADD ON: HCPCS | Performed by: PSYCHIATRY & NEUROLOGY

## 2024-06-27 NOTE — PROGRESS NOTES
Ambulatory Visit  Name: Enio Leo Jr.      : 1955      MRN: [id number]  Encounter Provider: Kaleb Bradley MD  Encounter Date: 2024   Encounter department: NEUROLOGY [facility]    Assessment & Plan   1. Left calf atrophy  Assessment & Plan:  Overall, this patient continues to have atrophy and decreased bulk in the left calf muscles, symptoms most consistent with a chronic S1 radiculopathy, possibly from scar tissue from prior surgery with loss of motor neurons in the calf muscles with aging.  Remainder of the exam continues to be stable, reassured him the symptoms are not consistent with a progressive neurodegenerative disease such as motor neuron diseases.  Patient has already been evaluated by neurosurgery, has been recommended against surgical intervention, which I agree.  Encouraged him to remain physically active and exercise on a regular basis to maintain the strength.    He will return for a follow-up with us in a year, and has my contact information in the meantime for any questions or concerns.  2. Peripheral polyneuropathy  Assessment & Plan:  Besides this, patient does have peripheral neuropathy, predominantly axonal changes, patient does have prior history of non-Hodgkin's lymphoma, with continued monoclonal, apathy.  Patient continues to follow-up with hematology, neuropathy symptoms have been stable, with predominantly numbness in the feet, does not have much in the way of dysesthesias or neuropathic pain that needs to be managed.  Fall precautions were discussed today, strongly encouraged him to start using a walking stick when he goes for his walk.  3. Lumbar radiculopathy      History of Present Illness     Enio Leo Jr. is a 68 y.o. male who presents for follow-up in the neuromuscular clinic for left calf atrophy, From chronic S1 radiculopathy, as well as peripheral neuropathy.  He was last seen by me in 2023, now returns for  follow-up.    Since last being seen, patient continues to have weakness in the left lower extremity, with decreased bulk in the left common calf but this is chronic, he does not think there has been any further decline.  Balance continues to be an issue, he has to be extra cautious, does report stumbling and easy tendency to fall, does not use any assistive devices.  He denies any major falls.  Does have chronic lower back pain, feels he has gotten used to the pain, does not particularly bothers him anymore.  Denies any bladder or bowel changes.  Patient is working on diet control, has lost 27 pounds over the last several months, walking up to 3 to 5 miles a day, is able to ambulate without any assistive devices.    Recent labs May 2024: Immunofixation monoclonal protein characterized as small IgM kappa.  IgG low 181, IgA and IgM were normal, free light chains, kappa within normal range, lambda minimally low, kappa to lambda ratio was normal  December 2023 TSH normal.  October 2023 hemoglobin A1c 5.9.    As you recall, patient has history of lumbar spine surgery in 1996, at the time he had lower back pain with radicular symptoms to the left lower extremity.  The symptoms of calf atrophy started about 3 years ago, has been very gradual since then.  Denies any significant numbness in feet, or hands, no muscle fasciculations or atrophy elsewhere, no weakness in the right lower extremity or either upper extremity.  No speech or swallowing difficulty.    Besides this, does have history of non-Hodgkin's lymphoma, treated with rituximab more than a decade ago, continues to follow with hematology at [facility], and has been stable.    EMG July 2023: Study was reviewed with the patient again today, sensory responses were mildly reduced, but can be considered normal for this patient's age, in addition bilateral peroneal and tibial motor responses were reduced with normal to prolonged latencies, normal to mild slowing of  conduction velocities.  Peroneal motor responses from tibialis anterior muscles were normal.  Needle examination showed chronic neurogenic motor unit action potentials in the left L5-S1 innervated muscles, and the right S1 innervated muscles, with ongoing denervation in bilateral gastrocnemius muscle.  These findings are most consistent with bilateral chronic lumbar radiculopathies, affecting the L5-S1 myotome on the left, and S1 myotome on the right, with ongoing denervation in bilateral S1 innervated muscles with mild underlying axonal neuropathy.    MRI lumbar spine June 2023:  has postoperative changes at L5-S1 on the left, post left laminectomy, heterogeneous signal was reported within the left paramedian anterior epidural space abutting the ventral aspect of S1 nerve without significant compression or displacement, has the appearance of combination of mild epidural fibrosis/granulation tissue as well as small residual disc herniation and associated endplate hypertrophic change.  Mild degenerative changes were also noted at L4-5, without significant canal stenosis or foraminal narrowing.    MRI lumbar spine without contrast February 2023:    Redemonstrated left laminectomy at L5-S1.  Heterogeneous signal change in the left lateral recess contacting left S1 nerve root.  Cannot differentiate disc herniation from granulation/scar tissue related to possible prior discectomy.    Contrast-enhanced MRI would be helpful to differentiate disc herniation from postsurgical change (If there is history of prior discectomy).  Finding is stable.  There is no nerve root compression.  Mild degenerative canal stenosis at level L4-5.     Labs July 2023 hemoglobin A1c 6.1, CMP normal, TSH 5.99 with normal free T4,     I reviewed the below ROS and what is mentioned in HPI, the remainder of ROS was negative.  Review of Systems   Constitutional:  Negative for appetite change, fatigue and fever.   HENT: Negative.  Negative for hearing  loss, tinnitus, trouble swallowing and voice change.    Eyes: Negative.  Negative for photophobia, pain and visual disturbance.   Respiratory: Negative.  Negative for shortness of breath.    Cardiovascular: Negative.  Negative for palpitations.   Gastrointestinal: Negative.  Negative for nausea and vomiting.   Endocrine: Negative.  Negative for cold intolerance.   Genitourinary: Negative.  Negative for dysuria, frequency and urgency.   Musculoskeletal:  Negative for back pain, gait problem, myalgias, neck pain and neck stiffness.   Skin: Negative.  Negative for rash.   Allergic/Immunologic: Negative.    Neurological: Negative.  Negative for dizziness, tremors, seizures, syncope, facial asymmetry, speech difficulty, weakness, light-headedness, numbness and headaches.   Hematological: Negative.  Does not bruise/bleed easily.   Psychiatric/Behavioral: Negative.  Negative for confusion, hallucinations and sleep disturbance.    All other systems reviewed and are negative.      Objective     /68 (BP Location: Left arm, Patient Position: Sitting, Cuff Size: Adult)   Pulse 61   Temp 98.2 °F (36.8 °C) (Temporal)   Ht 6' (1.829 m)   Wt 108 kg (238 lb 8 oz)   SpO2 98%   BMI 32.35 kg/m²     Physical Exam  On general examination, patient was not in any acute distress.  HEENT was unremarkable.  Extremities did not reveal any edema.    Neurologically, patient was awake and alert.  Speech was fluent without any dysarthria or aphasia.  Cranial examination did not reveal any ptosis at baseline, with sustained upward gaze.  Extraocular movements were smooth and conjugate.  Strength of eye closure muscles was normal.  Patient was able to puff out his cheeks well, and push his tongue into his cheeks well, facial sensations were normal bilaterally, tongue was midline without atrophy or fasciculations.  On motor examination, patient had decreased bulk in the left calf, unchanged from previous visits.  No fasciculations were  noted, no other atrophy was noted in the right lower or either upper extremity.  Strength was full in neck flexors, extensors, and all muscle groups in both upper and right lower extremities.  In the left lower extremity, hip flexors, knee extensor were graded as 5/5, knee flexors were 4+, ankle dorsiflexors were graded as 5, plantar flexors were 4-4+.  Sensation was decreased in a length dependent fashion to pinprick and temperature up to ankles bilaterally, unchanged from previous visits, vibration was absent at the left toes, 8 to 9 seconds at the right big toe, reflexes were 2 in the upper extremities, trace at the knees, absent at the ankles bilaterally.  Patient was unable to stand on tiptoes on the left.  Administrative Statements

## 2024-06-28 NOTE — ASSESSMENT & PLAN NOTE
Overall, this patient continues to have atrophy and decreased bulk in the left calf muscles, symptoms most consistent with a chronic S1 radiculopathy, possibly from scar tissue from prior surgery with loss of motor neurons in the calf muscles with aging.  Remainder of the exam continues to be stable, reassured him the symptoms are not consistent with a progressive neurodegenerative disease such as motor neuron diseases.  Patient has already been evaluated by neurosurgery, has been recommended against surgical intervention, which I agree.  Encouraged him to remain physically active and exercise on a regular basis to maintain the strength.    He will return for a follow-up with us in a year, and has my contact information in the meantime for any questions or concerns.

## 2024-06-28 NOTE — ASSESSMENT & PLAN NOTE
Besides this, patient does have peripheral neuropathy, predominantly axonal changes, patient does have prior history of non-Hodgkin's lymphoma, with continued monoclonal, apathy.  Patient continues to follow-up with hematology, neuropathy symptoms have been stable, with predominantly numbness in the feet, does not have much in the way of dysesthesias or neuropathic pain that needs to be managed.  Fall precautions were discussed today, strongly encouraged him to start using a walking stick when he goes for his walk.

## 2025-06-23 ENCOUNTER — TELEPHONE (OUTPATIENT)
Age: 70
End: 2025-06-23

## 2025-06-23 NOTE — TELEPHONE ENCOUNTER
Pt called regarding his appointment for 6/26/25. Confirmed the appointment with pt and also provided him with the office address.

## 2025-06-26 ENCOUNTER — OFFICE VISIT (OUTPATIENT)
Dept: NEUROLOGY | Facility: CLINIC | Age: 70
End: 2025-06-26
Payer: MEDICARE

## 2025-06-26 VITALS
DIASTOLIC BLOOD PRESSURE: 80 MMHG | HEIGHT: 72 IN | SYSTOLIC BLOOD PRESSURE: 130 MMHG | OXYGEN SATURATION: 97 % | WEIGHT: 264 LBS | HEART RATE: 60 BPM | BODY MASS INDEX: 35.76 KG/M2

## 2025-06-26 DIAGNOSIS — G62.9 PERIPHERAL POLYNEUROPATHY: ICD-10-CM

## 2025-06-26 DIAGNOSIS — M62.562 LEFT CALF ATROPHY: Primary | ICD-10-CM

## 2025-06-26 PROCEDURE — 99213 OFFICE O/P EST LOW 20 MIN: CPT | Performed by: NURSE PRACTITIONER

## 2025-06-26 RX ORDER — METFORMIN HYDROCHLORIDE 500 MG/1
500 TABLET, EXTENDED RELEASE ORAL
COMMUNITY
Start: 2025-04-29 | End: 2026-04-29

## 2025-06-26 NOTE — PROGRESS NOTES
Review of Systems   Constitutional:  Negative for appetite change, fatigue and fever.   HENT: Negative.  Negative for hearing loss, tinnitus, trouble swallowing and voice change.    Eyes: Negative.  Negative for photophobia, pain and visual disturbance.   Respiratory: Negative.  Negative for shortness of breath.    Cardiovascular: Negative.  Negative for palpitations.   Gastrointestinal: Negative.  Negative for nausea and vomiting.   Endocrine: Negative.  Negative for cold intolerance.   Genitourinary: Negative.  Negative for dysuria, frequency and urgency.   Musculoskeletal:  Positive for gait problem. Negative for back pain, myalgias, neck pain and neck stiffness.   Skin: Negative.  Negative for rash.   Allergic/Immunologic: Negative.    Neurological:  Negative for dizziness, tremors, seizures, syncope, facial asymmetry, speech difficulty, weakness, light-headedness, numbness and headaches.   Hematological: Negative.  Does not bruise/bleed easily.   Psychiatric/Behavioral: Negative.  Negative for confusion, hallucinations and sleep disturbance.

## 2025-06-26 NOTE — ASSESSMENT & PLAN NOTE
Patient with atrophy and decreased bulk in the left calf muscles, he denies any worsening of this since last visit.  This is likely due to chronic S1 radiculopathy, possibly from scar tissue from prior surgery.  His exam continues to be stable.  He does have chronic low back pain, he was evaluated by neurosurgery in the past who did recommend against surgical intervention.  He denies any red flag symptoms.  He was encouraged to remain active, fall precautions discussed.

## 2025-06-26 NOTE — PROGRESS NOTES
Name: Enio Leo Jr.      : 1955      MRN: [id number]  Encounter Provider: LISA Duque  Encounter Date: 2025   Encounter department: NEUROLOGY [facility]  :  Assessment & Plan  Left calf atrophy  Patient with atrophy and decreased bulk in the left calf muscles, he denies any worsening of this since last visit.  This is likely due to chronic S1 radiculopathy, possibly from scar tissue from prior surgery.  His exam continues to be stable.  He does have chronic low back pain, he was evaluated by neurosurgery in the past who did recommend against surgical intervention.  He denies any red flag symptoms.  He was encouraged to remain active, fall precautions discussed.       Peripheral polyneuropathy  Patient with numbness in the feet, he did have EMG changes consistent with a peripheral neuropathy.  He does have a history of non-Hodgkin's lymphoma, most recent A1c was in the diabetic range.  He understands the importance of good blood sugar control.  He denies any neuropathic pain.             History of Present Illness   HPI   Enio Leo Jr. is a 68 y.o. male who presents for follow-up in the neuromuscular clinic for left calf atrophy, From chronic S1 radiculopathy, as well as peripheral neuropathy.     To review, he does have a history of lumbar spine surgery in , in  he started to notice symptoms of calf atrophy that were gradually progressive over time.  EMG did show evidence of an S1 radiculopathy.    Last office visit 2024 in which no changes were made.     Interval History:  He continues to note asymmetry in the calves, no worsening.   Has chronic low back pain-no change, no radiating pain down the leg.  NO new bowel or bladder symptoms.   He does have numbness in the feet is unchanged.     He did have a fall recently in which he was running downhill and sent to stop and had difficulty doing so and fell.   No worsening balance. No other falls.     A1c 7.1  4/2024 -started metformin    EMG July 2023: Study was reviewed with the patient again today, sensory responses were mildly reduced, but can be considered normal for this patient's age, in addition bilateral peroneal and tibial motor responses were reduced with normal to prolonged latencies, normal to mild slowing of conduction velocities.  Peroneal motor responses from tibialis anterior muscles were normal.  Needle examination showed chronic neurogenic motor unit action potentials in the left L5-S1 innervated muscles, and the right S1 innervated muscles, with ongoing denervation in bilateral gastrocnemius muscle.  These findings are most consistent with bilateral chronic lumbar radiculopathies, affecting the L5-S1 myotome on the left, and S1 myotome on the right, with ongoing denervation in bilateral S1 innervated muscles with mild underlying axonal neuropathy.     MRI lumbar spine June 2023:  has postoperative changes at L5-S1 on the left, post left laminectomy, heterogeneous signal was reported within the left paramedian anterior epidural space abutting the ventral aspect of S1 nerve without significant compression or displacement, has the appearance of combination of mild epidural fibrosis/granulation tissue as well as small residual disc herniation and associated endplate hypertrophic change.  Mild degenerative changes were also noted at L4-5, without significant canal stenosis or foraminal narrowing.     MRI lumbar spine without contrast February 2023:    Redemonstrated left laminectomy at L5-S1.  Heterogeneous signal change in the left lateral recess contacting left S1 nerve root.  Cannot differentiate disc herniation from granulation/scar tissue related to possible prior discectomy.    Contrast-enhanced MRI would be helpful to differentiate disc herniation from postsurgical change (If there is history of prior discectomy).  Finding is stable.  There is no nerve root compression.  Mild degenerative canal  stenosis at level L4-5.     Labs July 2023 hemoglobin A1c 6.1, CMP normal, TSH 5.99 with normal free T4,        Review of Systems        Review of Systems   Constitutional:  Negative for appetite change, fatigue and fever.   HENT: Negative.  Negative for hearing loss, tinnitus, trouble swallowing and voice change.    Eyes: Negative.  Negative for photophobia, pain and visual disturbance.   Respiratory: Negative.  Negative for shortness of breath.    Cardiovascular: Negative.  Negative for palpitations.   Gastrointestinal: Negative.  Negative for nausea and vomiting.   Endocrine: Negative.  Negative for cold intolerance.   Genitourinary: Negative.  Negative for dysuria, frequency and urgency.   Musculoskeletal:  Positive for gait problem. Negative for back pain, myalgias, neck pain and neck stiffness.   Skin: Negative.  Negative for rash.   Allergic/Immunologic: Negative.    Neurological:  Negative for dizziness, tremors, seizures, syncope, facial asymmetry, speech difficulty, weakness, light-headedness, numbness and headaches.   Hematological: Negative.  Does not bruise/bleed easily.   Psychiatric/Behavioral: Negative.  Negative for confusion, hallucinations and sleep disturbance.             I have personally reviewed the MA's review of systems and made changes as necessary.         Objective   /80 (BP Location: Right arm, Patient Position: Sitting, Cuff Size: Large)   Pulse 60   Ht 6' (1.829 m)   Wt 120 kg (264 lb)   SpO2 97%   BMI 35.80 kg/m²     Physical Exam  Constitutional:       General: He is awake.   HENT:      Right Ear: Hearing normal.      Left Ear: Hearing normal.     Eyes:      General: Lids are normal.      Extraocular Movements: Extraocular movements intact.       Neurological:      Mental Status: He is alert.      Deep Tendon Reflexes:      Reflex Scores:       Bicep reflexes are 2+ on the right side and 2+ on the left side.       Brachioradialis reflexes are 2+ on the right side and 2+  on the left side.       Patellar reflexes are 1+ on the right side and 1+ on the left side.       Achilles reflexes are 0 on the right side and 0 on the left side.    Psychiatric:         Speech: Speech normal.     Neurological Exam  Mental Status  Awake and alert. Speech is normal. Language is fluent with no aphasia.    Cranial Nerves  CN III, IV, VI: Extraocular movements intact bilaterally. Normal lids and orbits bilaterally.  CN V:  Right: Facial sensation is normal.  Left: Facial sensation is normal on the left.  CN VII:  Right: There is no facial weakness.  Left: There is no facial weakness.  CN VIII:  Right: Hearing is normal.  Left: Hearing is normal.  CN XI:  Right: Trapezius strength is normal.  Left: Trapezius strength is normal.  CN XII: Tongue midline without atrophy or fasciculations.    Motor  Decreased muscle bulk throughout. Left calf atrophy.                                               Right                     Left  Elbow flexion                         5                          5  Elbow extension                    5                          5  Wrist flexion                           5                           Wrist extension                      5                          5  Finger flexion                         5                          5  Finger abduction                    5                          5  Hip flexion                              5                          5  Knee flexion                           5                          5  Knee extension                      5                          5  Ankle inversion                      5                          5  Ankle eversion                       5                          5  Plantarflexion                         5                          4+  Dorsiflexion                            5                          5    Sensory  Light touch is normal in upper and lower extremities. Pinprick abnormality: Diminished in the s1  distribution in the LLE, otherwise normal  . Temperature abnormality: Diminished in the s1 distribution in the LLE, otherwise normal. Vibration abnormality: 8 secs at the toes on the right, 3 secs on the left. Proprioception is normal in upper and lower extremities.     Reflexes                                            Right                      Left  Brachioradialis                    2+                         2+  Biceps                                 2+                         2+  Patellar                                1+                         1+  Achilles                                0                         0    Gait  Casual gait is normal including stance, stride, and arm swing. Toe walking abnormality: Able to rise from chair without using arms.

## 2025-06-26 NOTE — ASSESSMENT & PLAN NOTE
Patient with numbness in the feet, he did have EMG changes consistent with a peripheral neuropathy.  He does have a history of non-Hodgkin's lymphoma, most recent A1c was in the diabetic range.  He understands the importance of good blood sugar control.  He denies any neuropathic pain.